# Patient Record
Sex: FEMALE | Race: OTHER | Employment: OTHER | URBAN - NONMETROPOLITAN AREA
[De-identification: names, ages, dates, MRNs, and addresses within clinical notes are randomized per-mention and may not be internally consistent; named-entity substitution may affect disease eponyms.]

---

## 2022-05-04 ENCOUNTER — TELEPHONE (OUTPATIENT)
Dept: ENT CLINIC | Age: 72
End: 2022-05-04

## 2022-05-04 NOTE — TELEPHONE ENCOUNTER
Received a call from this patient, she's coming from [facility], she's hoping to get surgery from you . As she was talking she had to take a deep breath between each couple words, assuming she has a trach. She expressed her hope to get surgery the next day or two so that she doesn't have to fly out or drive out again for surgery. I told her it may require more than one trip and that your booking up with surgeries. Please advise on if we should reschedule her to a different date to where we could have that option with surgery following day or what the best option would be.

## 2022-05-05 NOTE — TELEPHONE ENCOUNTER
Next time I am in the clinic, please get this information in front of me so I can take a look at it. Most likely you are correct, she will need to schedule to see me substantially further in the future if she wishes to have me hold a surgical spot for her tentatively based on her desire to get surgery during the same trip. Please confirm that she has a tracheostomy. That would be germane to making this choice.     Thank you    PFC

## 2022-05-09 ENCOUNTER — TELEPHONE (OUTPATIENT)
Dept: ENT CLINIC | Age: 72
End: 2022-05-09

## 2022-05-09 NOTE — TELEPHONE ENCOUNTER
I spoke to Carlos Raya and her son Elizabeth Baker today about her visit in July. She is scheduled in the office on Tuesday 7/5/22 at 1:00pm.  I am holding surgery time on Wednesday 7/6/22 @ 12:00pm (and will keep Friday 7/8/22 lighter in case you need to take her back to surgery prior to discharge). They were informed she will likely be in the hospital through the weekend and I have her scheduled in the office for a post op on Monday 7/11/22. Elizabeth Baker said he will book round trip flights for 1 week and if he needs to change the date to return home he will worry about that then. They would love a call from Dr Anabell Valverde regarding his thoughts on the plan if possible. Please call Elizabeth Baker at [phone].

## 2022-05-11 ENCOUNTER — TELEPHONE (OUTPATIENT)
Dept: ENT CLINIC | Age: 72
End: 2022-05-11

## 2022-05-11 NOTE — TELEPHONE ENCOUNTER
Patient is asking about possibly needing a breathing test before surgery. I am not sure if she is needing pulmonary clearance before her procedure? Her son was on the other line and he says they received a call from her lung dr stating she needed to schedule a breathing test for her upcoming surgery. I advised that if pulmonary clearance is needed that would probably be something that they would request.  Could you call her back to discuss?

## 2022-05-13 NOTE — TELEPHONE ENCOUNTER
I tried to reach the patient and her son but no answer and voicemail was full. I did enter her Medicare information in her chart, however, the patient will need to know that her [employer] shows not valid. Also, we do not take out of state Medicaid or Medicaid plans.

## 2022-05-19 NOTE — TELEPHONE ENCOUNTER
I tried to reach the patient and her son Sonido Estrella again today but had to leave another voicemail. I left a vague voicemail letting her know that for surgery at [address] [place] [address] in general we require a clearance from cardiology and pulmonary (if being seen by either specialty) and that if the pulmonary physician needed testing, as indicated, in order to clear her for surgery in [place] she should follow through with that. Gave her my direct phone # to call if any further questions. She is scheduled for an appointment in the office with Dr Sharon Brush on 7/5/22. He has asked me to hold surgery time for her on 7/6/22 to help with her travels but will confirm exact surgery plan when here for her appointment. Surgery is holding this time for Dr Sharon Brush.

## 2022-05-20 NOTE — TELEPHONE ENCOUNTER
The patient and her son called and stated she did have the breathing test, however, the Dr wanted her to get a sleep study also and Antionette Ny does not want to enter their hospital right now if not necessary. They felt like the Dr was taking advantage of ordering a bunch of unnecessary testing. She said she does not see pulmonary on a regular basis and it is not for COPD. I informed them we should not need a clearance for surgery if she does not have pulmonary disease and she and her son assure me she does not.

## 2022-07-05 ENCOUNTER — HOSPITAL ENCOUNTER (OUTPATIENT)
Age: 72
Discharge: HOME | DRG: 140 | End: 2022-07-05
Payer: MEDICARE

## 2022-07-05 ENCOUNTER — PREP FOR PROCEDURE (OUTPATIENT)
Dept: ENT CLINIC | Age: 72
End: 2022-07-05

## 2022-07-05 ENCOUNTER — OFFICE VISIT (OUTPATIENT)
Dept: ENT CLINIC | Age: 72
End: 2022-07-05
Payer: MEDICARE

## 2022-07-05 ENCOUNTER — PROCEDURE VISIT (OUTPATIENT)
Dept: ENT CLINIC | Age: 72
End: 2022-07-05
Payer: MEDICARE

## 2022-07-05 ENCOUNTER — HOSPITAL ENCOUNTER (OUTPATIENT)
Dept: CT IMAGING | Age: 72
Discharge: HOME | DRG: 140 | End: 2022-07-05
Payer: MEDICARE

## 2022-07-05 VITALS
OXYGEN SATURATION: 92 % | HEART RATE: 42 BPM | RESPIRATION RATE: 12 BRPM | HEIGHT: 62 IN | WEIGHT: 98.6 LBS | BODY MASS INDEX: 18.14 KG/M2 | DIASTOLIC BLOOD PRESSURE: 64 MMHG | SYSTOLIC BLOOD PRESSURE: 102 MMHG | TEMPERATURE: 96.1 F

## 2022-07-05 VITALS — SYSTOLIC BLOOD PRESSURE: 102 MMHG | DIASTOLIC BLOOD PRESSURE: 64 MMHG

## 2022-07-05 DIAGNOSIS — Q31.0 LARYNGEAL WEB: Primary | ICD-10-CM

## 2022-07-05 DIAGNOSIS — J38.7 CRICOARYTENOID JOINT FIXATION: ICD-10-CM

## 2022-07-05 DIAGNOSIS — R06.1 CHRONIC STRIDOR: ICD-10-CM

## 2022-07-05 DIAGNOSIS — R06.1 STRIDOR: Primary | ICD-10-CM

## 2022-07-05 DIAGNOSIS — Z98.890 HISTORY OF TRACHEOSTOMY: ICD-10-CM

## 2022-07-05 DIAGNOSIS — Z01.818 PRE-OP TESTING: Primary | ICD-10-CM

## 2022-07-05 DIAGNOSIS — J38.6 POSTERIOR GLOTTIC STENOSIS: ICD-10-CM

## 2022-07-05 DIAGNOSIS — R06.1 STRIDOR: ICD-10-CM

## 2022-07-05 DIAGNOSIS — Z01.818 PRE-OP TESTING: ICD-10-CM

## 2022-07-05 PROBLEM — J39.8 TRACHEA, STENOSIS: Status: ACTIVE | Noted: 2022-07-05

## 2022-07-05 LAB
ALBUMIN SERPL-MCNC: 4.6 G/DL (ref 3.5–5.1)
ALP BLD-CCNC: 70 U/L (ref 38–126)
ALT SERPL-CCNC: 32 U/L (ref 11–66)
ANION GAP SERPL CALCULATED.3IONS-SCNC: 9 MEQ/L (ref 8–16)
AST SERPL-CCNC: 44 U/L (ref 5–40)
BASOPHILS # BLD: 0.6 %
BASOPHILS ABSOLUTE: 0.1 THOU/MM3 (ref 0–0.1)
BILIRUB SERPL-MCNC: 0.3 MG/DL (ref 0.3–1.2)
BUN BLDV-MCNC: 14 MG/DL (ref 7–22)
CALCIUM SERPL-MCNC: 10 MG/DL (ref 8.5–10.5)
CHLORIDE BLD-SCNC: 97 MEQ/L (ref 98–111)
CO2: 33 MEQ/L (ref 23–33)
CREAT SERPL-MCNC: 0.5 MG/DL (ref 0.4–1.2)
EOSINOPHIL # BLD: 0.1 %
EOSINOPHILS ABSOLUTE: 0 THOU/MM3 (ref 0–0.4)
ERYTHROCYTE [DISTWIDTH] IN BLOOD BY AUTOMATED COUNT: 13.5 % (ref 11.5–14.5)
ERYTHROCYTE [DISTWIDTH] IN BLOOD BY AUTOMATED COUNT: 47.7 FL (ref 35–45)
GFR SERPL CREATININE-BSD FRML MDRD: > 90 ML/MIN/1.73M2
GLUCOSE BLD-MCNC: 94 MG/DL (ref 70–108)
HCT VFR BLD CALC: 41.6 % (ref 37–47)
HEMOGLOBIN: 13.5 GM/DL (ref 12–16)
IMMATURE GRANS (ABS): 0.02 THOU/MM3 (ref 0–0.07)
IMMATURE GRANULOCYTES: 0.2 %
LYMPHOCYTES # BLD: 10.1 %
LYMPHOCYTES ABSOLUTE: 0.9 THOU/MM3 (ref 1–4.8)
MCH RBC QN AUTO: 31.3 PG (ref 26–33)
MCHC RBC AUTO-ENTMCNC: 32.5 GM/DL (ref 32.2–35.5)
MCV RBC AUTO: 96.5 FL (ref 81–99)
MONOCYTES # BLD: 0.9 %
MONOCYTES ABSOLUTE: 0.1 THOU/MM3 (ref 0.4–1.3)
NUCLEATED RED BLOOD CELLS: 0 /100 WBC
PLATELET # BLD: 239 THOU/MM3 (ref 130–400)
PMV BLD AUTO: 9.2 FL (ref 9.4–12.4)
POTASSIUM SERPL-SCNC: 4 MEQ/L (ref 3.5–5.2)
RBC # BLD: 4.31 MILL/MM3 (ref 4.2–5.4)
SEG NEUTROPHILS: 88.1 %
SEGMENTED NEUTROPHILS ABSOLUTE COUNT: 7.5 THOU/MM3 (ref 1.8–7.7)
SODIUM BLD-SCNC: 139 MEQ/L (ref 135–145)
TOTAL PROTEIN: 7.7 G/DL (ref 6.1–8)
WBC # BLD: 8.5 THOU/MM3 (ref 4.8–10.8)

## 2022-07-05 PROCEDURE — 1036F TOBACCO NON-USER: CPT | Performed by: PHYSICIAN ASSISTANT

## 2022-07-05 PROCEDURE — 70490 CT SOFT TISSUE NECK W/O DYE: CPT

## 2022-07-05 PROCEDURE — G8427 DOCREV CUR MEDS BY ELIG CLIN: HCPCS | Performed by: PHYSICIAN ASSISTANT

## 2022-07-05 PROCEDURE — G8400 PT W/DXA NO RESULTS DOC: HCPCS | Performed by: PHYSICIAN ASSISTANT

## 2022-07-05 PROCEDURE — 96372 THER/PROPH/DIAG INJ SC/IM: CPT | Performed by: OTOLARYNGOLOGY

## 2022-07-05 PROCEDURE — 85025 COMPLETE CBC W/AUTO DIFF WBC: CPT

## 2022-07-05 PROCEDURE — G8419 CALC BMI OUT NRM PARAM NOF/U: HCPCS | Performed by: PHYSICIAN ASSISTANT

## 2022-07-05 PROCEDURE — 1090F PRES/ABSN URINE INCON ASSESS: CPT | Performed by: PHYSICIAN ASSISTANT

## 2022-07-05 PROCEDURE — 3017F COLORECTAL CA SCREEN DOC REV: CPT | Performed by: PHYSICIAN ASSISTANT

## 2022-07-05 PROCEDURE — 99203 OFFICE O/P NEW LOW 30 MIN: CPT | Performed by: PHYSICIAN ASSISTANT

## 2022-07-05 PROCEDURE — 1123F ACP DISCUSS/DSCN MKR DOCD: CPT | Performed by: PHYSICIAN ASSISTANT

## 2022-07-05 PROCEDURE — 80053 COMPREHEN METABOLIC PANEL: CPT

## 2022-07-05 RX ORDER — AMOXICILLIN AND CLAVULANATE POTASSIUM 875; 125 MG/1; MG/1
1 TABLET, FILM COATED ORAL 2 TIMES DAILY
Status: ON HOLD | COMMUNITY
Start: 2021-10-25 | End: 2022-07-14 | Stop reason: HOSPADM

## 2022-07-05 RX ORDER — SODIUM CHLORIDE 0.9 % (FLUSH) 0.9 %
5-40 SYRINGE (ML) INJECTION PRN
Status: CANCELLED | OUTPATIENT
Start: 2022-07-05

## 2022-07-05 RX ORDER — SODIUM CHLORIDE 9 MG/ML
INJECTION, SOLUTION INTRAVENOUS PRN
Status: CANCELLED | OUTPATIENT
Start: 2022-07-05

## 2022-07-05 RX ORDER — DEXAMETHASONE SODIUM PHOSPHATE 10 MG/ML
6 INJECTION INTRAMUSCULAR; INTRAVENOUS ONCE
Status: COMPLETED | OUTPATIENT
Start: 2022-07-05 | End: 2022-07-05

## 2022-07-05 RX ORDER — BUDESONIDE 0.5 MG/2ML
0.5 INHALANT ORAL
Status: ON HOLD | COMMUNITY
Start: 2022-03-16 | End: 2022-07-14

## 2022-07-05 RX ORDER — DEXAMETHASONE SODIUM PHOSPHATE 4 MG/ML
4 INJECTION, SOLUTION INTRA-ARTICULAR; INTRALESIONAL; INTRAMUSCULAR; INTRAVENOUS; SOFT TISSUE ONCE
Qty: 0.4 ML | Refills: 0
Start: 2022-07-05 | End: 2022-07-05

## 2022-07-05 RX ORDER — LISINOPRIL 10 MG/1
TABLET ORAL
Status: ON HOLD | COMMUNITY
Start: 2022-06-24 | End: 2022-07-12 | Stop reason: HOSPADM

## 2022-07-05 RX ORDER — SODIUM CHLORIDE 0.9 % (FLUSH) 0.9 %
5-40 SYRINGE (ML) INJECTION EVERY 12 HOURS SCHEDULED
Status: CANCELLED | OUTPATIENT
Start: 2022-07-05

## 2022-07-05 RX ORDER — FLUCONAZOLE 100 MG/1
TABLET ORAL
Status: ON HOLD | COMMUNITY
Start: 2022-06-24 | End: 2022-07-14 | Stop reason: HOSPADM

## 2022-07-05 RX ORDER — ESCITALOPRAM OXALATE 10 MG/1
10 TABLET ORAL
Status: ON HOLD | COMMUNITY
Start: 2021-10-25 | End: 2022-07-13

## 2022-07-05 RX ADMIN — DEXAMETHASONE SODIUM PHOSPHATE 6 MG: 10 INJECTION INTRAMUSCULAR; INTRAVENOUS at 15:42

## 2022-07-05 NOTE — PROGRESS NOTES
[facility]  Otolaryngology Head and Neck Surgery  Dr. Sandra Maloney MD  Pt Name: Letha Davis  MRN: [id number]  YOB: 1950  Date of evaluation: 7/5/2022  Primary Care Physician: No primary care provider on file. 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 See the note from Lisa Carlson for further evaluation. Type of Endoscopy: Flexible Fiberoptic Nasolaryngoscopy    Anesthesia: Topical Lidocaine after Afrin vasoconstriction    Consent: taken and witnessed        History of Chief Complaint: Stridor       No chief complaint on file. Past Medical History   has no past medical history on file. Past Surgical History   has a past surgical history that includes Throat surgery (2010). Medications  Current Medications:   Current Outpatient Medications   Medication Sig Dispense Refill    fluconazole (DIFLUCAN) 100 MG tablet TAKE 1 TABLET (100 MG) BY MOUTH DAILY FOR 3 DAYS.  metoprolol tartrate (LOPRESSOR) 25 MG tablet Take 25 mg by mouth 2 times daily      lisinopril (PRINIVIL;ZESTRIL) 10 MG tablet TAKE 1 TABLET BY MOUTH EVERY DAY      budesonide (PULMICORT) 0.5 MG/2ML nebulizer suspension Inhale 0.5 mg into the lungs      amoxicillin-clavulanate (AUGMENTIN) 875-125 MG per tablet Take 1 tablet by mouth 2 times daily      escitalopram (LEXAPRO) 10 MG tablet Take 10 mg by mouth      dexamethasone (DECADRON) 4 MG/ML injection Inject 1 mL into the muscle once for 1 dose 0.4 mL 0     No current facility-administered medications for this visit. Home Medications:   Prior to Admission medications    Medication Sig Start Date End Date Taking? Authorizing Provider   fluconazole (DIFLUCAN) 100 MG tablet TAKE 1 TABLET (100 MG) BY MOUTH DAILY FOR 3 DAYS.  6/24/22   Historical Provider, MD   metoprolol tartrate (LOPRESSOR) 25 MG tablet Take 25 mg by mouth 2 times daily 3/16/22 7/25/22  Historical Provider, MD   lisinopril (PRINIVIL;ZESTRIL) 10 MG tablet TAKE 1 TABLET BY MOUTH EVERY DAY 6/24/22   Historical Provider, MD   budesonide (PULMICORT) 0.5 MG/2ML nebulizer suspension Inhale 0.5 mg into the lungs 3/16/22 4/29/23  Historical Provider, MD   amoxicillin-clavulanate (AUGMENTIN) 875-125 MG per tablet Take 1 tablet by mouth 2 times daily 10/25/21   Historical Provider, MD   escitalopram (LEXAPRO) 10 MG tablet Take 10 mg by mouth 10/25/21 10/26/22  Historical Provider, MD   dexamethasone (DECADRON) 4 MG/ML injection Inject 1 mL into the muscle once for 1 dose 7/5/22 7/5/22  Jerome Carroll MD       Allergies  Patient has no known allergies. Family History  family history is not on file. Social History  Tobacco use:  reports that she has never smoked. She has never used smokeless tobacco.  Alcohol use:  reports previous alcohol use. Drug use:  has no history on file for drug use. Findings:     The patient's larynx was abnormal for the presence of an extremely crowded larynx with her right vocal fold being in an anatomical position and her left side being across the midline and opposing the right side in a manner consistent with either synkinesis or a posterior glottic web that is very much biased towards the ankylosis of the left side. Regardless of the cause, it is a nonserviceable airway that the patient is somehow tolerating. Laryngoscopy images:                              Addendum: CT scan images            See below for addendum interpretation                IMPRESSIONS:  1. 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 This happened so long ago that it is impossible to tell without rigid instrumentation and manipulation of her soft tissue and cartilaginous tissue elements. Addendum:  2.   After reviewing the patient's high-resolution CT scan of the neck I see strong confirmation of significant aberration of the cartilaginous and calcium mineralized components of the left cricoarytenoid joint with a canting of the joint and body of the arytenoid in 3 planes towards the right side. The right cricoarytenoid joint has a distinct appearance of healthiness and therefore likely of mobility. See below in the plan for a follow-up on addendum. PLAN:  1. To the operating room for an effort at airway widening that would include one or several of the following possibilities:  Left true vocal fold lateralization  Left true vocal fold arytenoid partial resection  Post cricoid flap reconstruction of the posterior Commissure mobilization of the right side if it is found to be ankylosed   Among others    I will use jet ventilation to start the case and we will probably convert to percutaneous jet ventilation early on so as to not encumber my access to the posterior commissure with a check catheter. She will not be possible to jet ventilate for very long using laryngoscopic air sources given the narrowness of her glottic aperture. I reviewed with the patient and her family the indications benefits limitations and risks of proceeding in this manner to their satisfaction. They reported being pleased with the outcome of the visit and being willing to proceed as recommended. I will get a CT scan of her neck to look into the soft tissues of the larynx with the hopes that I can visualize some degree of ankylosis on the left side and hopefully normal joint on the right side. 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   Plan is much more firmly anchored to the patient's cricoarytenoid anatomy now and I will proceed with what I believed to be a significantly higher chance of success with a transoral laryngoplasty and reconstruction.            Sandra Maloney MD   Electronically signed 7/5/2022 at 5:45 PM
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
## 2022-07-06 ENCOUNTER — ANESTHESIA (OUTPATIENT)
Dept: OPERATING ROOM | Age: 72
DRG: 140 | End: 2022-07-06
Payer: MEDICARE

## 2022-07-06 ENCOUNTER — APPOINTMENT (OUTPATIENT)
Dept: GENERAL RADIOLOGY | Age: 72
DRG: 140 | End: 2022-07-06
Attending: OTOLARYNGOLOGY
Payer: MEDICARE

## 2022-07-06 ENCOUNTER — HOSPITAL ENCOUNTER (INPATIENT)
Age: 72
LOS: 6 days | Discharge: HOME | DRG: 140 | End: 2022-07-12
Attending: OTOLARYNGOLOGY | Admitting: OTOLARYNGOLOGY
Payer: MEDICARE

## 2022-07-06 ENCOUNTER — ANESTHESIA EVENT (OUTPATIENT)
Dept: OPERATING ROOM | Age: 72
DRG: 140 | End: 2022-07-06
Payer: MEDICARE

## 2022-07-06 DIAGNOSIS — R06.1 STRIDOR: ICD-10-CM

## 2022-07-06 PROBLEM — Z98.890 STATUS POST SURGERY: Status: ACTIVE | Noted: 2022-07-06

## 2022-07-06 LAB
ANION GAP SERPL CALCULATED.3IONS-SCNC: 7 MEQ/L (ref 8–16)
BASE EXCESS (CALCULATED): 0.7 MMOL/L (ref -2.5–2.5)
BUN BLDV-MCNC: 14 MG/DL (ref 7–22)
CALCIUM IONIZED SERUM: 1.2 MMOL/L (ref 1.12–1.32)
CALCIUM SERPL-MCNC: 8.3 MG/DL (ref 8.5–10.5)
CHLORIDE BLD-SCNC: 106 MEQ/L (ref 98–111)
CO2: 28 MEQ/L (ref 23–33)
COLLECTED BY:: ABNORMAL
CREAT SERPL-MCNC: 0.5 MG/DL (ref 0.4–1.2)
EKG ATRIAL RATE: 64 BPM
EKG ATRIAL RATE: 66 BPM
EKG P AXIS: 57 DEGREES
EKG P AXIS: 63 DEGREES
EKG P-R INTERVAL: 118 MS
EKG P-R INTERVAL: 126 MS
EKG Q-T INTERVAL: 438 MS
EKG Q-T INTERVAL: 480 MS
EKG QRS DURATION: 104 MS
EKG QRS DURATION: 96 MS
EKG QTC CALCULATION (BAZETT): 459 MS
EKG QTC CALCULATION (BAZETT): 495 MS
EKG R AXIS: 12 DEGREES
EKG R AXIS: 18 DEGREES
EKG T AXIS: 111 DEGREES
EKG T AXIS: 130 DEGREES
EKG VENTRICULAR RATE: 64 BPM
EKG VENTRICULAR RATE: 66 BPM
GFR SERPL CREATININE-BSD FRML MDRD: > 90 ML/MIN/1.73M2
GLUCOSE BLD-MCNC: 119 MG/DL (ref 70–108)
GLUCOSE BLD-MCNC: 87 MG/DL (ref 70–108)
GLUCOSE, WHOLE BLOOD: 102 MG/DL (ref 70–108)
HCO3: 27 MMOL/L (ref 23–28)
HCT VFR BLD CALC: 33.4 % (ref 37–47)
HEMOGLOBIN: 10.6 GM/DL (ref 12–16)
MAGNESIUM: 1.9 MG/DL (ref 1.6–2.4)
O2 SATURATION: 100 %
PCO2: 49 MMHG (ref 35–45)
PH BLOOD GAS: 7.35 (ref 7.35–7.45)
PO2: 436 MMHG (ref 71–104)
POTASSIUM SERPL-SCNC: 4.2 MEQ/L (ref 3.5–5.2)
POTASSIUM, WHOLE BLOOD: 3.3 MEQ/L (ref 3.5–4.9)
SODIUM BLD-SCNC: 141 MEQ/L (ref 135–145)
SODIUM, WHOLE BLOOD: 143 MEQ/L (ref 138–146)

## 2022-07-06 PROCEDURE — 93010 ELECTROCARDIOGRAM REPORT: CPT | Performed by: INTERNAL MEDICINE

## 2022-07-06 PROCEDURE — 6360000002 HC RX W HCPCS: Performed by: REGISTERED NURSE

## 2022-07-06 PROCEDURE — 7100000001 HC PACU RECOVERY - ADDTL 15 MIN: Performed by: OTOLARYNGOLOGY

## 2022-07-06 PROCEDURE — 85018 HEMOGLOBIN: CPT

## 2022-07-06 PROCEDURE — 2709999900 HC NON-CHARGEABLE SUPPLY: Performed by: OTOLARYNGOLOGY

## 2022-07-06 PROCEDURE — 88305 TISSUE EXAM BY PATHOLOGIST: CPT

## 2022-07-06 PROCEDURE — 6360000002 HC RX W HCPCS: Performed by: OTOLARYNGOLOGY

## 2022-07-06 PROCEDURE — 85014 HEMATOCRIT: CPT

## 2022-07-06 PROCEDURE — 93005 ELECTROCARDIOGRAM TRACING: CPT | Performed by: OTOLARYNGOLOGY

## 2022-07-06 PROCEDURE — 82803 BLOOD GASES ANY COMBINATION: CPT

## 2022-07-06 PROCEDURE — 2580000003 HC RX 258: Performed by: OTOLARYNGOLOGY

## 2022-07-06 PROCEDURE — 3600000004 HC SURGERY LEVEL 4 BASE: Performed by: OTOLARYNGOLOGY

## 2022-07-06 PROCEDURE — 82330 ASSAY OF CALCIUM: CPT

## 2022-07-06 PROCEDURE — 82948 REAGENT STRIP/BLOOD GLUCOSE: CPT

## 2022-07-06 PROCEDURE — 3700000001 HC ADD 15 MINUTES (ANESTHESIA): Performed by: OTOLARYNGOLOGY

## 2022-07-06 PROCEDURE — 84295 ASSAY OF SERUM SODIUM: CPT

## 2022-07-06 PROCEDURE — 2580000003 HC RX 258: Performed by: PHYSICIAN ASSISTANT

## 2022-07-06 PROCEDURE — 3700000000 HC ANESTHESIA ATTENDED CARE: Performed by: OTOLARYNGOLOGY

## 2022-07-06 PROCEDURE — 2100000000 HC CCU R&B

## 2022-07-06 PROCEDURE — 71045 X-RAY EXAM CHEST 1 VIEW: CPT

## 2022-07-06 PROCEDURE — 31552 LARYNGOPLASTY LARYNGEAL STEN: CPT | Performed by: OTOLARYNGOLOGY

## 2022-07-06 PROCEDURE — 0CBS0ZZ EXCISION OF LARYNX, OPEN APPROACH: ICD-10-PCS | Performed by: OTOLARYNGOLOGY

## 2022-07-06 PROCEDURE — 5A1935Z RESPIRATORY VENTILATION, LESS THAN 24 CONSECUTIVE HOURS: ICD-10-PCS | Performed by: OTOLARYNGOLOGY

## 2022-07-06 PROCEDURE — 3600000014 HC SURGERY LEVEL 4 ADDTL 15MIN: Performed by: OTOLARYNGOLOGY

## 2022-07-06 PROCEDURE — APPNB180 APP NON BILLABLE TIME > 60 MINS: Performed by: NURSE PRACTITIONER

## 2022-07-06 PROCEDURE — 80048 BASIC METABOLIC PNL TOTAL CA: CPT

## 2022-07-06 PROCEDURE — 6360000002 HC RX W HCPCS: Performed by: PHYSICIAN ASSISTANT

## 2022-07-06 PROCEDURE — 84132 ASSAY OF SERUM POTASSIUM: CPT

## 2022-07-06 PROCEDURE — 82947 ASSAY GLUCOSE BLOOD QUANT: CPT

## 2022-07-06 PROCEDURE — 94002 VENT MGMT INPAT INIT DAY: CPT

## 2022-07-06 PROCEDURE — 2500000003 HC RX 250 WO HCPCS: Performed by: REGISTERED NURSE

## 2022-07-06 PROCEDURE — 7100000000 HC PACU RECOVERY - FIRST 15 MIN: Performed by: OTOLARYNGOLOGY

## 2022-07-06 PROCEDURE — 31561 LARYNSCOP REMVE CART + SCOP: CPT | Performed by: OTOLARYNGOLOGY

## 2022-07-06 PROCEDURE — 36415 COLL VENOUS BLD VENIPUNCTURE: CPT

## 2022-07-06 PROCEDURE — 2700000000 HC OXYGEN THERAPY PER DAY

## 2022-07-06 PROCEDURE — 94761 N-INVAS EAR/PLS OXIMETRY MLT: CPT

## 2022-07-06 PROCEDURE — 83735 ASSAY OF MAGNESIUM: CPT

## 2022-07-06 RX ORDER — GLYCOPYRROLATE 1 MG/5 ML
SYRINGE (ML) INTRAVENOUS PRN
Status: DISCONTINUED | OUTPATIENT
Start: 2022-07-06 | End: 2022-07-06 | Stop reason: SDUPTHER

## 2022-07-06 RX ORDER — ACETAMINOPHEN 650 MG/1
650 SUPPOSITORY RECTAL EVERY 6 HOURS PRN
Status: DISCONTINUED | OUTPATIENT
Start: 2022-07-06 | End: 2022-07-12 | Stop reason: HOSPADM

## 2022-07-06 RX ORDER — DEXAMETHASONE SODIUM PHOSPHATE 10 MG/ML
INJECTION, EMULSION INTRAMUSCULAR; INTRAVENOUS PRN
Status: DISCONTINUED | OUTPATIENT
Start: 2022-07-06 | End: 2022-07-06 | Stop reason: SDUPTHER

## 2022-07-06 RX ORDER — SODIUM CHLORIDE 9 MG/ML
INJECTION, SOLUTION INTRAVENOUS CONTINUOUS
Status: DISCONTINUED | OUTPATIENT
Start: 2022-07-06 | End: 2022-07-09

## 2022-07-06 RX ORDER — LIDOCAINE HYDROCHLORIDE 20 MG/ML
INJECTION, SOLUTION INTRAVENOUS PRN
Status: DISCONTINUED | OUTPATIENT
Start: 2022-07-06 | End: 2022-07-06 | Stop reason: SDUPTHER

## 2022-07-06 RX ORDER — PIPERACILLIN SODIUM, TAZOBACTAM SODIUM 3; .375 G/15ML; G/15ML
INJECTION, POWDER, LYOPHILIZED, FOR SOLUTION INTRAVENOUS PRN
Status: DISCONTINUED | OUTPATIENT
Start: 2022-07-06 | End: 2022-07-06 | Stop reason: SDUPTHER

## 2022-07-06 RX ORDER — FENTANYL CITRATE 50 UG/ML
INJECTION, SOLUTION INTRAMUSCULAR; INTRAVENOUS PRN
Status: DISCONTINUED | OUTPATIENT
Start: 2022-07-06 | End: 2022-07-06 | Stop reason: SDUPTHER

## 2022-07-06 RX ORDER — HYDROMORPHONE HCL 110MG/55ML
PATIENT CONTROLLED ANALGESIA SYRINGE INTRAVENOUS PRN
Status: DISCONTINUED | OUTPATIENT
Start: 2022-07-06 | End: 2022-07-06 | Stop reason: SDUPTHER

## 2022-07-06 RX ORDER — EPINEPHRINE 1 MG/ML
INJECTION, SOLUTION, CONCENTRATE INTRAVENOUS PRN
Status: DISCONTINUED | OUTPATIENT
Start: 2022-07-06 | End: 2022-07-06 | Stop reason: ALTCHOICE

## 2022-07-06 RX ORDER — POLYETHYLENE GLYCOL 3350 17 G/17G
17 POWDER, FOR SOLUTION ORAL DAILY PRN
Status: DISCONTINUED | OUTPATIENT
Start: 2022-07-06 | End: 2022-07-12 | Stop reason: HOSPADM

## 2022-07-06 RX ORDER — ONDANSETRON 4 MG/1
4 TABLET, ORALLY DISINTEGRATING ORAL EVERY 8 HOURS PRN
Status: DISCONTINUED | OUTPATIENT
Start: 2022-07-06 | End: 2022-07-12 | Stop reason: HOSPADM

## 2022-07-06 RX ORDER — BUDESONIDE 0.5 MG/2ML
0.5 INHALANT ORAL 2 TIMES DAILY
Status: CANCELLED | OUTPATIENT
Start: 2022-07-06

## 2022-07-06 RX ORDER — SODIUM CHLORIDE 9 MG/ML
INJECTION, SOLUTION INTRAVENOUS PRN
Status: DISCONTINUED | OUTPATIENT
Start: 2022-07-06 | End: 2022-07-12 | Stop reason: HOSPADM

## 2022-07-06 RX ORDER — SODIUM CHLORIDE 0.9 % (FLUSH) 0.9 %
5-40 SYRINGE (ML) INJECTION EVERY 12 HOURS SCHEDULED
Status: DISCONTINUED | OUTPATIENT
Start: 2022-07-06 | End: 2022-07-06 | Stop reason: HOSPADM

## 2022-07-06 RX ORDER — METOCLOPRAMIDE HYDROCHLORIDE 5 MG/ML
10 INJECTION INTRAMUSCULAR; INTRAVENOUS
Status: DISCONTINUED | OUTPATIENT
Start: 2022-07-06 | End: 2022-07-06 | Stop reason: HOSPADM

## 2022-07-06 RX ORDER — ONDANSETRON 2 MG/ML
4 INJECTION INTRAMUSCULAR; INTRAVENOUS EVERY 6 HOURS PRN
Status: DISCONTINUED | OUTPATIENT
Start: 2022-07-06 | End: 2022-07-12 | Stop reason: HOSPADM

## 2022-07-06 RX ORDER — FENTANYL CITRATE 50 UG/ML
25 INJECTION, SOLUTION INTRAMUSCULAR; INTRAVENOUS EVERY 5 MIN PRN
Status: DISCONTINUED | OUTPATIENT
Start: 2022-07-06 | End: 2022-07-06 | Stop reason: HOSPADM

## 2022-07-06 RX ORDER — SODIUM CHLORIDE 9 MG/ML
INJECTION, SOLUTION INTRAVENOUS PRN
Status: DISCONTINUED | OUTPATIENT
Start: 2022-07-06 | End: 2022-07-06 | Stop reason: HOSPADM

## 2022-07-06 RX ORDER — SODIUM CHLORIDE 0.9 % (FLUSH) 0.9 %
5-40 SYRINGE (ML) INJECTION PRN
Status: DISCONTINUED | OUTPATIENT
Start: 2022-07-06 | End: 2022-07-06 | Stop reason: HOSPADM

## 2022-07-06 RX ORDER — ACETAMINOPHEN 325 MG/1
650 TABLET ORAL EVERY 6 HOURS PRN
Status: DISCONTINUED | OUTPATIENT
Start: 2022-07-06 | End: 2022-07-12 | Stop reason: HOSPADM

## 2022-07-06 RX ORDER — MIDAZOLAM HYDROCHLORIDE 1 MG/ML
INJECTION INTRAMUSCULAR; INTRAVENOUS PRN
Status: DISCONTINUED | OUTPATIENT
Start: 2022-07-06 | End: 2022-07-06 | Stop reason: SDUPTHER

## 2022-07-06 RX ORDER — FENTANYL CITRATE 50 UG/ML
50 INJECTION, SOLUTION INTRAMUSCULAR; INTRAVENOUS EVERY 5 MIN PRN
Status: DISCONTINUED | OUTPATIENT
Start: 2022-07-06 | End: 2022-07-06 | Stop reason: HOSPADM

## 2022-07-06 RX ORDER — NALOXONE HYDROCHLORIDE 0.4 MG/ML
INJECTION, SOLUTION INTRAMUSCULAR; INTRAVENOUS; SUBCUTANEOUS PRN
Status: DISCONTINUED | OUTPATIENT
Start: 2022-07-06 | End: 2022-07-06 | Stop reason: SDUPTHER

## 2022-07-06 RX ORDER — ONDANSETRON 2 MG/ML
INJECTION INTRAMUSCULAR; INTRAVENOUS PRN
Status: DISCONTINUED | OUTPATIENT
Start: 2022-07-06 | End: 2022-07-06 | Stop reason: SDUPTHER

## 2022-07-06 RX ORDER — FLUMAZENIL 0.1 MG/ML
INJECTION, SOLUTION INTRAVENOUS PRN
Status: DISCONTINUED | OUTPATIENT
Start: 2022-07-06 | End: 2022-07-06 | Stop reason: SDUPTHER

## 2022-07-06 RX ORDER — SODIUM CHLORIDE 0.9 % (FLUSH) 0.9 %
5-40 SYRINGE (ML) INJECTION EVERY 12 HOURS SCHEDULED
Status: DISCONTINUED | OUTPATIENT
Start: 2022-07-06 | End: 2022-07-12 | Stop reason: HOSPADM

## 2022-07-06 RX ORDER — DIPHENHYDRAMINE HYDROCHLORIDE 50 MG/ML
12.5 INJECTION INTRAMUSCULAR; INTRAVENOUS
Status: DISCONTINUED | OUTPATIENT
Start: 2022-07-06 | End: 2022-07-06 | Stop reason: HOSPADM

## 2022-07-06 RX ORDER — ROCURONIUM BROMIDE 10 MG/ML
INJECTION, SOLUTION INTRAVENOUS PRN
Status: DISCONTINUED | OUTPATIENT
Start: 2022-07-06 | End: 2022-07-06 | Stop reason: SDUPTHER

## 2022-07-06 RX ORDER — ENOXAPARIN SODIUM 100 MG/ML
30 INJECTION SUBCUTANEOUS EVERY 24 HOURS
Status: DISCONTINUED | OUTPATIENT
Start: 2022-07-07 | End: 2022-07-12 | Stop reason: HOSPADM

## 2022-07-06 RX ORDER — SODIUM CHLORIDE 0.9 % (FLUSH) 0.9 %
5-40 SYRINGE (ML) INJECTION PRN
Status: DISCONTINUED | OUTPATIENT
Start: 2022-07-06 | End: 2022-07-12 | Stop reason: HOSPADM

## 2022-07-06 RX ORDER — PROPOFOL 10 MG/ML
INJECTION, EMULSION INTRAVENOUS PRN
Status: DISCONTINUED | OUTPATIENT
Start: 2022-07-06 | End: 2022-07-06 | Stop reason: SDUPTHER

## 2022-07-06 RX ORDER — KETOROLAC TROMETHAMINE 30 MG/ML
15 INJECTION, SOLUTION INTRAMUSCULAR; INTRAVENOUS EVERY 6 HOURS PRN
Status: DISPENSED | OUTPATIENT
Start: 2022-07-06 | End: 2022-07-11

## 2022-07-06 RX ADMIN — HYDROMORPHONE HYDROCHLORIDE 0.5 MG: 2 INJECTION INTRAMUSCULAR; INTRAVENOUS; SUBCUTANEOUS at 13:31

## 2022-07-06 RX ADMIN — FLUMAZENIL 0.2 MG: 0.1 INJECTION INTRAVENOUS at 15:43

## 2022-07-06 RX ADMIN — LIDOCAINE HYDROCHLORIDE 100 MG: 20 INJECTION, SOLUTION INTRAVENOUS at 13:07

## 2022-07-06 RX ADMIN — FENTANYL CITRATE 25 MCG: 50 INJECTION, SOLUTION INTRAMUSCULAR; INTRAVENOUS at 13:55

## 2022-07-06 RX ADMIN — SUGAMMADEX 200 MG: 100 INJECTION, SOLUTION INTRAVENOUS at 15:10

## 2022-07-06 RX ADMIN — SUGAMMADEX 200 MG: 100 INJECTION, SOLUTION INTRAVENOUS at 15:50

## 2022-07-06 RX ADMIN — Medication 0.2 MG: at 13:14

## 2022-07-06 RX ADMIN — ROCURONIUM BROMIDE 50 MG: 50 INJECTION, SOLUTION INTRAVENOUS at 13:29

## 2022-07-06 RX ADMIN — PHENYLEPHRINE HYDROCHLORIDE 100 MCG: 10 INJECTION INTRAVENOUS at 13:40

## 2022-07-06 RX ADMIN — PHENYLEPHRINE HYDROCHLORIDE 200 MCG: 10 INJECTION INTRAVENOUS at 14:07

## 2022-07-06 RX ADMIN — ROCURONIUM BROMIDE 30 MG: 50 INJECTION, SOLUTION INTRAVENOUS at 13:51

## 2022-07-06 RX ADMIN — MIDAZOLAM 1 MG: 1 INJECTION INTRAMUSCULAR; INTRAVENOUS at 13:52

## 2022-07-06 RX ADMIN — PROPOFOL 50 MG: 10 INJECTION, EMULSION INTRAVENOUS at 14:12

## 2022-07-06 RX ADMIN — PROPOFOL 50 MCG/KG/MIN: 10 INJECTION, EMULSION INTRAVENOUS at 13:18

## 2022-07-06 RX ADMIN — SODIUM CHLORIDE: 9 INJECTION, SOLUTION INTRAVENOUS at 13:01

## 2022-07-06 RX ADMIN — Medication 0.2 MG: at 13:27

## 2022-07-06 RX ADMIN — FLUMAZENIL 0.1 MG: 0.1 INJECTION INTRAVENOUS at 15:49

## 2022-07-06 RX ADMIN — MIDAZOLAM 1 MG: 1 INJECTION INTRAMUSCULAR; INTRAVENOUS at 13:34

## 2022-07-06 RX ADMIN — PHENYLEPHRINE HYDROCHLORIDE 200 MCG: 10 INJECTION INTRAVENOUS at 13:38

## 2022-07-06 RX ADMIN — FLUMAZENIL 0.2 MG: 0.1 INJECTION INTRAVENOUS at 15:38

## 2022-07-06 RX ADMIN — FENTANYL CITRATE 50 MCG: 50 INJECTION, SOLUTION INTRAMUSCULAR; INTRAVENOUS at 13:07

## 2022-07-06 RX ADMIN — PIPERACILLIN AND TAZOBACTAM 3.38 G: 3; .375 INJECTION, POWDER, LYOPHILIZED, FOR SOLUTION INTRAVENOUS at 13:30

## 2022-07-06 RX ADMIN — PIPERACILLIN AND TAZOBACTAM 3375 MG: 3; .375 INJECTION, POWDER, LYOPHILIZED, FOR SOLUTION INTRAVENOUS at 23:19

## 2022-07-06 RX ADMIN — PROPOFOL 50 MG: 10 INJECTION, EMULSION INTRAVENOUS at 14:14

## 2022-07-06 RX ADMIN — ONDANSETRON 4 MG: 2 INJECTION INTRAMUSCULAR; INTRAVENOUS at 14:50

## 2022-07-06 RX ADMIN — PHENYLEPHRINE HYDROCHLORIDE 200 MCG: 10 INJECTION INTRAVENOUS at 13:23

## 2022-07-06 RX ADMIN — DEXAMETHASONE SODIUM PHOSPHATE 10 MG: 10 INJECTION, EMULSION INTRAMUSCULAR; INTRAVENOUS at 13:07

## 2022-07-06 RX ADMIN — NALOXONE HYDROCHLORIDE 0.4 MG: 0.4 INJECTION, SOLUTION INTRAMUSCULAR; INTRAVENOUS; SUBCUTANEOUS at 15:15

## 2022-07-06 RX ADMIN — PROPOFOL 120 MG: 10 INJECTION, EMULSION INTRAVENOUS at 13:08

## 2022-07-06 RX ADMIN — ROCURONIUM BROMIDE 20 MG: 50 INJECTION, SOLUTION INTRAVENOUS at 14:48

## 2022-07-06 RX ADMIN — KETOROLAC TROMETHAMINE 15 MG: 30 INJECTION, SOLUTION INTRAMUSCULAR; INTRAVENOUS at 20:23

## 2022-07-06 ASSESSMENT — PAIN - FUNCTIONAL ASSESSMENT: PAIN_FUNCTIONAL_ASSESSMENT: NONE - DENIES PAIN

## 2022-07-06 ASSESSMENT — PAIN SCALES - GENERAL: PAINLEVEL_OUTOF10: 0

## 2022-07-06 ASSESSMENT — PAIN SCALES - WONG BAKER: WONGBAKER_NUMERICALRESPONSE: 0

## 2022-07-06 NOTE — OP NOTE
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       Procedure(s):  Transoral Laryngoplasty Left Partial Arytenoidectomy, Left vocal cord lateralization and post cricoid flap  Procedure correction: No post cricoid flap performed      Surgeon(s):  Velia Manzanares MD    Assistant:   * No surgical staff found *    Anesthesia: General    Estimated Blood Loss (mL): less than 50     Complications: Other: Slow emergence from general anesthesia; still intubated and deeply sedated at time of op note dictation    Specimens:   ID Type Source Tests Collected by Time Destination   A : Left arytenoid cartilage Tissue Larynx SURGICAL PATHOLOGY Velia Manzanares MD 7/6/2022 1400        Implants:  * No implants in log *      Drains: * No LDAs found *    Findings: 1. Easy jet ventilation from start of case using transit laryngoscope technique  2. 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 Percutaneous jet ventilation instituted for the remainder of the case  4. Transoral arytenoid ectomy performed with excellent mobilization and lateralization of residual arytenoid as part of laryngoplasty  5. Very slow emergence from general anesthesia with near unresponsiveness for over 1 hour post discontinuation of sedating medications    Detailed Description of Procedure: The patient was taken to the operating room awake and placed in the supine position. General anesthesia was induced and the patient was intubated with an LMA device without difficulty and without vital sign instability. She was allowed to breathe spontaneously until just before the beginning of the case.   I fashioned a custom made heat activated maxillary dental guard to protect her dentition in preparation for suspension laryngoscopy. I performed a timeout verifying the patient's identity and planned procedure. Beginning of the case, neuromuscular blockade was instituted and I remove the LMA. I passed a Yamileth laryngoscope through the left lingual cul-de-sac and picked up the epiglottis starting jet ventilation through the laryngoscope with apparent efficiency as measured by sustained near 100% oxygen saturation rates. I then adjusted the position of the Yamileth to enable good line of sight access to the larynx. This was the position of the patient's laryngeal operation throughout the case. I placed cord spreaders between the vocal folds to examine the mobility of the arytenoids and found the left side to be virtually immobile. The right side was clearly mobile. I used a 30 degree optical telescope to examine the glottis and subglottis. I saw no clear signs of a posterior glottic web. The subglottis and trachea were widely patent. There were cartilaginous irregularities anteriorly consistent with her tracheostomy history. Using the telescope to visualize the trachea, I cleansed the anterior neck and then passed the jet ventilation catheter through the neck skin and a very shallow location consistent with her prior tracheostomy and scarring between the skin and trachea. I passed the catheter distally and remove the needle beginning jet ventilation from that point on. The patient remained at 100% saturation or nearly so throughout the case even during intervals where her FiO2 was below 0.5. Bringing onto the field and operating microscope with a CO2 UltraPulse laser set at 1 mm [place] with 0 delay, I began the patient's operation. Using a suction grasper in my left hand, I made a sigmoid incision through the supraglottis from anterior left to posterior medial right on the left supraglottis and arytenoid body.   I came posteriorly across the top of the arytenoid at approximately the mid sagittal plane of that cartilaginous structure in preparation for lateralizing this structure once I removed the lateral portion of the arytenoid cartilage. I gradually took this dissection deeply into the arytenoid body and then changed from the 1 mm [place] to a 2.5 mm straight line for an incision of the arytenoid down to the level of the cricoid cartilage. I gradually extended this incision through the arytenoid deeply enough to require dissection and traction on the residual cartilage posteriorly and distally on the side proceeded further towards the cricoid cartilage. With this incision deeply made, I dissected around the lateral aspect of the arytenoid in order to be able to isolate the central and lateral arytenoid for its removal.  I then used a variety of cup forceps to remove the central and lateral portion of the arytenoid handing off this tissue to be placed in formalin for permanent section. This required some degree of suction cautery to maintain hemostasis. Once I was deeply into this process and had made a generous opening of the space lateral to the medial arytenoid face, I turned my attention to examining the deep attachments. I found the arytenoid to be fixated onto the cricoid cartilage posteriorly and medially as well as along the deep space. I used curved alligator forceps to bluntly separate the structures from each other and the cup forceps to remove more of the attached cartilage in order to separate these tissues more broadly. I then compressed the residual medial arytenoid face laterally and could now widen the patient's airway significantly in the process. I then cleaned out the base of the arytenoid and found that it was broadly fused to the cricoid cartilage without being able to discover any residual cricoarytenoid joint facet structures. Suction cautery was necessary for hemostasis. I then brought onto the field and a 4-0 Prolene suture on PS2 cutting needle.   I passed a double throw lateralization suture through the soft tissues lateral to the arytenoid down to the thyroid lamina from proximal to distal and medial to lateral.  I drew the needle deep in the wound and passed it through the arytenoid body and into the airway lumen twice in the process. I ended in the wound anteriorly and superiorly and then anabell the entire suture out through the laryngoscope in preparation for clipping the traction suture with the arytenoid face lateralized. I irrigated out the wound with copious amounts of sterile saline and checked for hemostasis. Hemostasis was confirmed. I then placed an angled endoscopic clip applier with a medium wide tray Ethicon clip into the wound dry and the suture up in the process. I clipped it again and then later lysed the excess suture material.  I closed the wound superficially with two 4-0 Monocryl sutures on a P3 cutting needle in a figure-of-eight configuration. This closed the wound well. I then carefully examined the airway and found to be markedly enlarged compared to preop with clear movement of the right cord being visualized. I checked the distal airway and it was free of blood and obstructive secretions. I then intubated the patient with a 5.0 MLT tube with endoscopic guidance without difficulty. I remove the transoral hardware and turned the patient back to anesthesia for reversal extubation in the operating room. The patient was reversed from neuromuscular blockade but continued to remain deeply asleep and virtually unresponsive for a protracted period of time. She was given a substantial dose of Narcan and considered to have enough time to have metabolized the propofol that she had been given throughout the case. But given how long she was taken to emerge, the patient was taken to the PACU mechanically ventilated. No additional sedating medications whatsoever were given to the patient to the point of this dictation.   The patient went from being virtually unresponsive to responding to verbal commands with opening her eyes and was moving all 4 limbs. She will continue to undergo neurologic checks and will hopefully be extubated prior to being transferred to the ICU for close critical care. Jyoti Charles.  Mary Alvarez MD  Cell: [phone]    Electronically signed by Jerome Carroll MD on 7/6/2022 at 4:29 PM

## 2022-07-06 NOTE — PROGRESS NOTES
1900- new consult sent to Annie Luna NP.  2849- ST elevation noticed on telemetry monitor. Lima Richardson with ENT updated. Stat EKG order placed and EKG tech called. Patient assessed. VSS. Patient denies chest pain and shortness of breath. 1906- Rapid called, Annie Luna in room along with supportive staff. 3630 Maria Luz Funes called and updated. Patient resting in bed. No complaints at this time. Will continue to monitor.

## 2022-07-06 NOTE — H&P
The below attached note is still a current and accurate rendition of the patient's presentation, physical exam, and planned surgery. I reviewed all this with the patient and her family in the holding area to their satisfaction. We will proceed as recommended. Angel Gomez. MD Kristel Montez MD   Physician   Specialty:  Otolaryngology   Progress Notes       Addendum   Encounter Date:  7/5/2022                 Expand All Collapse All        Show:Clear all  [x]Manual[x]Template[x]Copied    Added by:  Mil Oneil MD      []ver for details      [address] [address]  Otolaryngology Head and Neck Surgery  Dr. Kristel Garcia MD  Pt Name: Marcelina Lam  MRN: [id number]  YOB: 1950  Date of evaluation: 7/5/2022  Primary Care Physician: No primary care provider on file.     Reason for Endoscopy: The patient has been referred by colleague laryngologist in [facility] to evaluate and treat her airway stenosis resulting in chronic stridor. See the note from Wil Meade for further evaluation.     Type of Endoscopy: Flexible Fiberoptic Nasolaryngoscopy     Anesthesia: Topical Lidocaine after Afrin vasoconstriction     Consent: taken and witnessed           History of Chief Complaint: Stridor         No chief complaint on file.                            Past Medical History   has no past medical history on file.     Past Surgical History   has a past surgical history that includes Throat surgery (2010).    Medications  Current Medications:   Current Facility-Administered Medications          Current Outpatient Medications   Medication Sig Dispense Refill    fluconazole (DIFLUCAN) 100 MG tablet TAKE 1 TABLET (100 MG) BY MOUTH DAILY FOR 3 DAYS.         metoprolol tartrate (LOPRESSOR) 25 MG tablet Take 25 mg by mouth 2 times daily        lisinopril (PRINIVIL;ZESTRIL) 10 MG tablet TAKE 1 TABLET BY MOUTH EVERY DAY        budesonide (PULMICORT) 0.5 MG/2ML nebulizer suspension Inhale 0.5 mg into the lungs        amoxicillin-clavulanate (AUGMENTIN) 875-125 MG per tablet Take 1 tablet by mouth 2 times daily        escitalopram (LEXAPRO) 10 MG tablet Take 10 mg by mouth        dexamethasone (DECADRON) 4 MG/ML injection Inject 1 mL into the muscle once for 1 dose 0.4 mL 0      No current facility-administered medications for this visit.         Home Medications:   Home Medications           Prior to Admission medications    Medication Sig Start Date End Date Taking? Authorizing Provider   fluconazole (DIFLUCAN) 100 MG tablet TAKE 1 TABLET (100 MG) BY MOUTH DAILY FOR 3 DAYS. 6/24/22     Historical Provider, MD   metoprolol tartrate (LOPRESSOR) 25 MG tablet Take 25 mg by mouth 2 times daily 3/16/22 7/25/22   Historical Provider, MD   lisinopril (PRINIVIL;ZESTRIL) 10 MG tablet TAKE 1 TABLET BY MOUTH EVERY DAY 6/24/22     Historical Provider, MD   budesonide (PULMICORT) 0.5 MG/2ML nebulizer suspension Inhale 0.5 mg into the lungs 3/16/22 4/29/23   Historical Provider, MD   amoxicillin-clavulanate (AUGMENTIN) 875-125 MG per tablet Take 1 tablet by mouth 2 times daily 10/25/21     Historical Provider, MD   escitalopram (LEXAPRO) 10 MG tablet Take 10 mg by mouth 10/25/21 10/26/22   Historical Provider, MD   dexamethasone (DECADRON) 4 MG/ML injection Inject 1 mL into the muscle once for 1 dose 7/5/22 7/5/22   Kristel Garcia MD            Allergies  Patient has no known allergies.     Family History  family history is not on file.     Social History  Tobacco use:  reports that she has never smoked. She has never used smokeless tobacco.  Alcohol use:  reports previous alcohol use.   Drug use:  has no history on file for drug use.     Findings:      The patient's larynx was abnormal for the presence of an extremely crowded larynx with her right vocal fold being in an anatomical position and her left side being across the midline and opposing the right side in a manner consistent with either synkinesis or a posterior glottic web that is very much biased towards the ankylosis of the left side. Regardless of the cause, it is a nonserviceable airway that the patient is somehow tolerating.     Laryngoscopy images:                                      Addendum: CT scan images               See below for addendum interpretation                       IMPRESSIONS:  1. 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 This happened so long ago that it is impossible to tell without rigid instrumentation and manipulation of her soft tissue and cartilaginous tissue elements. Addendum:  2. After reviewing the patient's high-resolution CT scan of the neck I see strong confirmation of significant aberration of the cartilaginous and calcium mineralized components of the left cricoarytenoid joint with a canting of the joint and body of the arytenoid in 3 planes towards the right side. The right cricoarytenoid joint has a distinct appearance of healthiness and therefore likely of mobility. See below in the plan for a follow-up on addendum.                 PLAN:  1. To the operating room for an effort at airway widening that would include one or several of the following possibilities:  Left true vocal fold lateralization  Left true vocal fold arytenoid partial resection  Post cricoid flap reconstruction of the posterior Commissure mobilization of the right side if it is found to be ankylosed      Among others     I will use jet ventilation to start the case and we will probably convert to percutaneous jet ventilation early on so as to not encumber my access to the posterior commissure with a check catheter.   She will not be possible to jet ventilate for very long using laryngoscopic air sources given the narrowness of her glottic aperture.     I reviewed with the patient and her family the indications benefits limitations and risks of proceeding in this manner to their satisfaction. They reported being pleased with the outcome of the visit and being willing to proceed as recommended. I will get a CT scan of her neck to look into the soft tissues of the larynx with the hopes that I can visualize some degree of ankylosis on the left side and hopefully normal joint on the right side.     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 Plan is much more firmly anchored to the patient's cricoarytenoid anatomy now and I will proceed with what I believed to be a significantly higher chance of success with a transoral laryngoplasty and reconstruction.                          Kristel Garcia MD   Electronically signed 7/5/2022 at 5:45 PM  [address] [place] [address]   Physician Assistant   Specialty:  Physician Assistant   Progress Notes       Signed   Encounter Date:  7/5/2022                 Signed        Expand All Collapse All        Show:Clear all  [x]Manual[x]Template[x]Copied    Added by:  [x]JAKOB Ibrahim      []Jaqui for details        [facility]  Mick M[address] [place] [address]  Dept: [phone]  Dept Fax: [phone]  Loc: [phone]     Marcelina Lam is a 74 y.o. female who was referred by Unknown, Provider, MD for:       Chief Complaint   Patient presents with    New Patient       patient is here for persistent stridor bilateral true vocal fold immobility with significantly narrowed airway   .     HPI:      The patient presents for evaluation of breathing issues and stridor. The patient was reportedly intubated for a prolonged period back in 2004 after reported incident with her  with supposed laughing gas and chloroform.  Patient was reportedly dropped off at the hospital as a \"Naima Calderon\" and reportedly needed intubated. The patient was intubated for \"a while\" before being extubated. The patient reportedly had difficulty breathing for years after extubation, but in 2009 she worsened to the point that she needed a trach. The trach was in place for about a month before being decannulated. Patient has followed with a few ENTs for her breathing issues. She reports a few procedures being completed, but hasn't resolved her noisy and difficult breathing. She had pneumonia a few months ago, but miraculously didn't need intubated. She reportedly lost about 15-20 lbs during that time. She is gaining some weight back, but has only put 5-10 of that back on. She does have a remote history of bulimia reportedly, but hasn't had bulimia issues in many years reportedly. She reports occasional dizzy spells that last a brief period with extension of her neck, but none at rest. She denies any hemoptysis. No choking/coughing when eating reportedly. She has been diagnosed. Subjective:      REVIEW OF SYSTEMS:    A complete multi-organ review of systems was performed using a new patient questionnaire, and reviewed by me. ENT:  negative except as noted in HPI  CONSTITUTIONAL:  negative except as noted in HPI  EYES:  negative except as noted in HPI  RESPIRATORY:  negative except as noted in HPI  CARDIOVASCULAR:  negative except as noted in HPI  GASTROINTESTINAL:  negative except as noted in HPI  GENITOURINARY:  negative except as noted in HPI  MUSCULOSKELETAL:  negative except as noted in HPI  SKIN:  negative except as noted in HPI  ENDOCRINE/METABOLIC: negative except as noted in HPI  HEMATOLOGIC/LYMPHATIC:  negative except as noted in HPI  ALLERGY/IMMUN: negative except as noted in HPI  NEUROLOGICAL:  negative except as noted in HPI  BEHAVIOR/PSYCH:  negative except as noted in HPI     Past Medical History:  Past Medical History   History reviewed.  No pertinent past medical history.        Social History:    TOBACCO:   reports that she has never smoked. She has never used smokeless tobacco.  ETOH:   reports previous alcohol use. DRUGS:   has no history on file for drug use.     Family History:   Family History   History reviewed. No pertinent family history.        Surgical History:  Past Surgical History         Past Surgical History:   Procedure Laterality Date    THROAT SURGERY   2010            Objective:      This is a 74 y.o. female. Patient is alert and oriented to person, place and time. Patient appears chronically ill/borderline nourishment. Mood is happy with normal affect. Not obviously hearing impaired. Voice is weak with audible stridor.      /64 (Site: Left Upper Arm, Position: Sitting)   Pulse (!) 42   Temp (!) 96.1 °F (35.6 °C) (Infrared)   Resp 12   Ht 5' 2\" (1.575 m)   Wt 98 lb 9.6 oz (44.7 kg)   SpO2 92%   BMI 18.03 kg/m²      Head:              Normocephalic, atraumatic. No obvious masses or lesions noted.     Ears:  External ears: Normal: no scars, lesions or masses. Mastoid process: No erythema noted. No tenderness to palpation. R External auditory canal: clear and free of any pathology  L External auditory canal: clear and free of any pathology   Tympanic membranes:  R intact, translucent                                                        L intact, translucent  Nose:               External nose: Appears midline. No obvious deformity or masses. Septum:  relatively midline anteriorly, but does appear to mildly deviate posteriorly. No septal hematoma. No perforation. Mucosa:  clear  Turbinates: very hypertrophic and congested            Discharge:  clear     Mouth/Throat:  Lips, tongue and oral cavity: Normal. No masses or lesions noted   Dentition: good, no malocclusion  Oral mucosa: moist  Oropharynx: normal-appearing mucosa  Hard and soft palates: symmetrical and intact.   Salivary glands: not enlarged and no tenderness to palpation. Uvula: midline, no obvious lesions              Gag reflex is present.     Neck: Trachea midline. Thyroid not enlarged, no palpable masses or tenderness. Lymphatic: No cervical lymphadenopathy noted. Eyes: CRISTELA, EOM intact. Conjunctiva moist without discharge. Lungs: Normal effort of breathing, not obviously distressed. Stridor at rest with clear lung sounds. She is tachypnic on my exam, but seems to likely be her baseline. No distress noted/reported. Cardiac: HR 94 during my assessment. Normal rate and rhytm. Neuro: Cranial nerves II-XII grossly intact. Extremities: No clubbing, edema, or cyanosis noted.     Procedure: Fiberoptic laryngoscopy was performed in the office with Dr Niurka King. See his procedure note for further description of the findings.      Outside records reviewed.      Assessment/Plan:       Diagnosis Orders   1. Stridor  Miscellaneous Surgery     CT SOFT TISSUE NECK WO CONTRAST   2. History of tracheostomy  Miscellaneous Surgery     CT SOFT TISSUE NECK WO CONTRAST      The patient is a 74 y.o. female that presents for evaluation of stridor and shortness of breath. Dr Niurka King performed fiberoptic examination and recommended proceeding with surgery. She will be scheduled for transoral laryngoplasty, partial arytenoidectomy, left vocal cord lateralization and post-cricoid flap with Jet ventilation. Dr Niurka King and I discussed the potential risks/benefits of the procedure with the patient. Some of the risks discussed include (but not limited to): post-op pain, post-op infection, post-op bleeding, possibility of a trach (likely temporary), pneumothorax. Order for ct neck without contrast placed for surgical planning, which can hopefully be completed before surgery tomorrow.  The patient expresses understanding and would like to proceed.     (Please note that portions of this note may have been completed with a voice recognition program.  Efforts were made to edit the dictation but occasionally words are mis-transcribed.)     Electronically signed by JAKOB Osman on 7/5/2022 at 4:09 PM                                Office Visit on 7/5/2022           Office Visit on 7/5/2022                Detailed Report            Note shared with patient        Progress Notes Info    Author Note Status Last Update User Last Update Date/Time   JAKOB Osman Signed Fanny Osman 7/5/2022  4:09 PM     Chart Review Routing History    No routing history on file. Procedure visit on 7/5/2022           Procedure visit on 7/5/2022                Revision History                Detailed Report            Note shared with patient        Progress Notes Info    Author Note Status Last Update User Last Update Date/Time   Jerome Carroll MD Addendum Jerome Carroll MD 7/5/2022  8:00 PM     Chart Review Routing History    No routing history on file.

## 2022-07-06 NOTE — CONSULTS
CRITICAL CARE PROGRESS NOTE      Patient:  Felicia Houston    Unit/Bed:4B-02/002-A  YOB: 1950  MRN: [id number]   PCP: No primary care provider on file. Date of Admission: 7/6/2022  Chief Complaint:- chronic stridor and shortness of breath    Assessment and Plan:    1. Acute respiratory failure, hypoxia: 7/6/2022 patient is currently on high flow nasal cannula titrate to maintain SaO2 greater than 90%. 7/6/2022 chest x-ray no acute abnormality. 2. Stridor: 7/6/2022 status post transoral laryngoplasty left parietal arytenoidectomy left vocal cord lateralization. Jet ventilation catheter remains in place capped at this time. Postoperatively patient had a very slow emergence from general anesthesia with near unresponsiveness for over 1 hour post discontinuation of sedating medications. Patient arrived postoperatively with improvement. Continue to undergo neurological checks. 3. Anemia, normocytic:  Monitor and trend  4. HFpEF:  History, stable, monitor  5. Essential HPTN:  History, stable. INITIAL H AND P AND ICU COURSE:  Zac Ya is a 74year old  female admitted to [facility] ICU 7/6/2022 under the care of Dr. Mervat Paulino. Patient has a significant history of lifetime nonsmoker, essential hypertension, pulmonary hypertension, HFpEF-ECHO 4/2022 LVEF >70% mild left ventricular outflow tract obstruction, RV Systolic Pressure 47, mild to mod tricuspid regurgitation, chronic hoarseness, tracheal stenosis, bilateral vocal fold fixation post prolonged intubation back in 2004 attempted partial cordectomy with laser and temporary tracheostomy back in 2009 which she was decannulated, polypectomy, chronic stridor anxiety, bulimia. Mandy Chua presented to ENT clinic 7/5/2022 under the care of Dr. Mervat Paulino performed fiberoptic examination and recommended OR intervention. CT of Neck 7/5/2022 left vocal cord paralysis without definite causative lesion.   Degenerative changes of cervical spine most pronounced at C5-6 where there is on covertebral joint degenerative change and facet hypertrophy with minimal grade 1 retrolisthesis of C5 relative to C6. There is mild right and moderate to severe left neural foraminal stenosis at this level. 7/6/2022 patient did undergo transoral laryngoplasty left partial arytenoidectomy. Very slow emergence from general anesthesia with near unresponsiveness for over 1 hour post discontinuation of sedating medications. Patient was placed on 6 L per nasal cannula and transferred to  for further management and care. Past Medical History:  See HPI. Family History: Mother and Father unknown. Social History:  Single, lifetime nonsmoker, denies any ETOH or illicit drug use. ROS   GENERAL: Positive for fatgue  SKN: No lesions or rashes. HEAD: No headaches or recent injury  EYES: No acute changes in vision, no diplopia or blurred vision, no drainage  EARS: No hearing loss, no tinnitus, no drainage  NOSE/THROAT: No rhinorrhea or pharyngitis, no nasal drainage  NECK: No lumps or unusual neck stiffness  PULMONARY: Positive for shortness of breath, dyspnea  CARDIAC: Positive for bradycardia  GI: No history melena or hematochezia, no diarrhea or constipation  PERIPHERAL VASCULAR: No intermittent claudication or unusual leg cramps  MUSCULOSKELETAL: Occasional arthralgias, myalgias  NEUROLOGICAL: No Seizures or Syncope  HEMATOLOGIC:  No anemia, unusual bruising or bleeding  PSYCH: No homicidal or suicidal ideations    Scheduled Meds:  Continuous Infusions:   sodium chloride         PHYSICAL EXAMINATION:  T: 97.3. P: 92. RR: 18. B/P: 128/73. FiO2:  50 HFNC. O2 Sat:  100. I/O:  pending  Body mass index is 18.61 kg/m². GCS:   12  General:   Pale warm dry  HEENT:  normocephalic and atraumatic. No scleral icterus. PERR  Neck: supple. No Thyromegaly. Lungs: clear to auscultation.   No retractions, Jet Ventilation Catheter intact no redness drainage or swelling noted around insertion site  Cardiac: RRR-bradycardia. No JVD. Abdomen: soft. Nontender, flat, bowel sounds present  Extremities:  No clubbing, cyanosis, or edema x 4. Vasculature: capillary refill < 3 seconds. Palpable dorsalis pedis pulses. Skin:  warm and dry. Psych:  Alert and oriented x2. Affect appropriate  Lymph:  No supraclavicular adenopathy. Neurologic:  No focal deficit. No seizures. Data: (All radiographs, tracings, PFTs, and imaging are personally viewed and interpreted unless otherwise noted).  7/6/2022 1613 sodium 143 ionized calcium is 1.2 potassium 3.3 glucose is 102   7/5/2022 hemoglobin 13.5 macro 21.6   7/6/2022 ABG pH 7.35 PCO2 49 PO2 436 bicarb 27   7/6/2022 chest x-ray no evidence of pneumothorax. Mild cardiomegaly. No pulmonary infiltrates or effusions. The pulmonary vasculature is normal.   EKG heart rate 64     Cardiac telemetry sinus bradycardia        Seen with multidisciplinary ICU team yes. Meets Continued ICU Level Care Criteria:    [x] Yes   [] No - Transfer Planned to listed location:  [] HOSPITALIST CONTACTED-      Case and plan discussed with  [place]         Electronically signed by IRINEO Curtis - CNP  CRITICAL CARE SPECIALIST

## 2022-07-06 NOTE — PROGRESS NOTES
1612 pt arrived to PACU, unresponsive on arrival. ETT in place, 21 at the lips. VSS  1627 Dr Corey Fitzpatrick at bedside  [address] Reba Gurrola at bedside  [id number] pt responsive to verbal stimuli. Opens eyes briefly on command. VSS  1647 CO2 monitoring hooked up at this time  1658 pt grimacing and opening eyes on command but not following further commands at this time. VSS  1715 Dr Corey Fitzpatrick at bedside  1719 pt switched to spontaneous ventilation, tolerating well  1745 respiratory at bedside, pt extubated at this time and placed on 6L NC  1755 pt resting off and on in bed, tolerating nasal cannula well.  VSS  1827 meets criteria for discharge from PACU, transported to [facility]

## 2022-07-06 NOTE — PROGRESS NOTES
ADMITTED TO [facility] AND ORIENTED TO UNIT. SCDS ON. FALL AND ALLERGY BANDS ON. PT VERBALIZED APPROVAL FOR FIRST NAME, LAST INITIAL AND PHYSICIAN NAME ON UNIT WHITEBOARD.

## 2022-07-06 NOTE — PROGRESS NOTES
Dr. Rufina Terry at bedside speaking with patient and her family. Her son states that he has already deleted the video and would not tape further.

## 2022-07-06 NOTE — ANESTHESIA PRE PROCEDURE
Department of Anesthesiology  Preprocedure Note       Name:  Juan Webber   Age:  74 y.o.  :  1950                                          MRN:  [id number]         Date:  2022      Surgeon: Pam Hays):  Patricia Guzman MD    Procedure: Procedure(s):  Transoral Laryngoplasty Bilateral Partial Arrytenoidectomy, Left vocal cord lateralization and post cricoid flap    Medications prior to admission:   Prior to Admission medications    Medication Sig Start Date End Date Taking? Authorizing Provider   fluconazole (DIFLUCAN) 100 MG tablet TAKE 1 TABLET (100 MG) BY MOUTH DAILY FOR 3 DAYS. Patient not taking: Reported on 2022   Historical Provider, MD   metoprolol tartrate (LOPRESSOR) 25 MG tablet Take 25 mg by mouth 2 times daily 3/16/22 7/25/22  Historical Provider, MD   lisinopril (PRINIVIL;ZESTRIL) 10 MG tablet TAKE 1 TABLET BY MOUTH EVERY DAY  Patient not taking: Reported on 2022   Historical Provider, MD   budesonide (PULMICORT) 0.5 MG/2ML nebulizer suspension Inhale 0.5 mg into the lungs 3/16/22 4/29/23  Historical Provider, MD   amoxicillin-clavulanate (AUGMENTIN) 875-125 MG per tablet Take 1 tablet by mouth 2 times daily  Patient not taking: Reported on 2022 10/25/21   Historical Provider, MD   escitalopram (LEXAPRO) 10 MG tablet Take 10 mg by mouth  Patient not taking: Reported on 2022 10/25/21 10/26/22  Historical Provider, MD       Current medications:    Current Facility-Administered Medications   Medication Dose Route Frequency Provider Last Rate Last Admin    0.9 % sodium chloride infusion   IntraVENous Continuous Patricia Guzman MD           Allergies:     Allergies   Allergen Reactions    Ciprofloxacin      No reaction patient refuses to take it    Levaquin [Levofloxacin]      Patient refused to take mediation due to questionable side effects    Morphine Hallucinations    Statins Nausea And Vomiting       Problem List:    Patient Active Problem List   Diagnosis Code    Trachea, 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    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    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    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    Chronic stridor R06.1    Status post surgery Z98.890       Past Medical History:        Diagnosis Date    CHF exacerbation ([address]     due to pneumonia infection    Hypertension     Pneumonia        Past Surgical History:        Procedure Laterality Date    THROAT SURGERY  2010       Social History:    Social History     Tobacco Use    Smoking status: Never Smoker    Smokeless tobacco: Never Used   Substance Use Topics    Alcohol use: Not Currently                                Counseling given: Not Answered      Vital Signs (Current):   Vitals:    07/06/22 1113 07/06/22 1119   BP: (!) 196/85    Pulse: 68    Resp: 24    Temp: 98.5 °F (36.9 °C)    TempSrc: Temporal    SpO2: 99%    Weight:  98 lb 8 oz (44.7 kg)   Height:  5' 1\" (1.549 m)                                              BP Readings from Last 3 Encounters:   07/06/22 (!) 196/85   07/05/22 102/64   07/05/22 102/64       NPO Status: Time of last liquid consumption: 2345                        Time of last solid consumption: 2345                        Date of last liquid consumption: 07/05/22                        Date of last solid food consumption: 07/05/22    BMI:   Wt Readings from Last 3 Encounters:   07/06/22 98 lb 8 oz (44.7 kg)   07/05/22 98 lb 9.6 oz (44.7 kg)     Body mass index is 18.61 kg/m².     CBC:   Lab Results   Component Value Date/Time    WBC 8.5 07/05/2022 04:58 PM    RBC 4.31 07/05/2022 04:58 PM    HGB 13.5 07/05/2022 04:58 PM    HCT 41.6 07/05/2022 04:58 PM    MCV 96.5 07/05/2022 04:58 PM     07/05/2022 04:58 PM       CMP:   Lab Results   Component Value Date/Time     07/05/2022 04:58 PM    K 4.0 07/05/2022 04:58 PM    CL 97 07/05/2022 04:58 PM    CO2 33 07/05/2022 04:58 PM    BUN 14 07/05/2022 04:58 PM    CREATININE 0.5 07/05/2022 04:58 PM    LABGLOM >90 07/05/2022 04:58 PM    GLUCOSE 94 07/05/2022 04:58 PM    PROT 7.7 07/05/2022 04:58 PM    CALCIUM 10.0 07/05/2022 04:58 PM    BILITOT 0.3 07/05/2022 04:58 PM    ALKPHOS 70 07/05/2022 04:58 PM    AST 44 07/05/2022 04:58 PM    ALT 32 07/05/2022 04:58 PM       POC Tests: No results for input(s): POCGLU, POCNA, POCK, POCCL, POCBUN, POCHEMO, POCHCT in the last 72 hours. Coags: No results found for: PROTIME, INR, APTT    HCG (If Applicable): No results found for: PREGTESTUR, PREGSERUM, HCG, HCGQUANT     ABGs: No results found for: PHART, PO2ART, OKX3ODB, FLX9UTM, BEART, E8XREGXW     Type & Screen (If Applicable):  No results found for: LABABO, LABRH    Drug/Infectious Status (If Applicable):  No results found for: HIV, HEPCAB    COVID-19 Screening (If Applicable): No results found for: COVID19        Anesthesia Evaluation  Patient summary reviewed no history of anesthetic complications:   Airway: Mallampati: II  TM distance: >3 FB   Neck ROM: full  Mouth opening: > = 3 FB   Dental: normal exam         Pulmonary:normal exam                               Cardiovascular:    (+) hypertension:, CHF:, hyperlipidemia                  Neuro/Psych:               GI/Hepatic/Renal:             Endo/Other:                     Abdominal:             Vascular: Other Findings:           Anesthesia Plan      general     ASA 3     (Jet Ventilation)  Induction: intravenous. MIPS: Postoperative opioids intended and Prophylactic antiemetics administered. Anesthetic plan and risks discussed with patient, spouse and child/children.                         Myriam Menchaca MD   7/6/2022

## 2022-07-06 NOTE — PROGRESS NOTES
I noticed patient's son following around the room with his cell with was resting in his lap. I asked patient if he was taping me and he said yes. I said that he did not ask me and that he was not suppose to. He shrugged his shoulders like no big deal. I excused my self and spoke to [address], Valentino [place]. She states that it is against [address] and I need to ask him to stop and delete the video. I went back to the room and asked that he do no further video and please delete the other video. He got disgruntle and states \" I tell you what, you get Dr. Morena Ivan to tell me that and maybe I will. \" I said that I will send my manager in to speak to him.

## 2022-07-07 PROCEDURE — 6360000002 HC RX W HCPCS: Performed by: PHYSICIAN ASSISTANT

## 2022-07-07 PROCEDURE — 99024 POSTOP FOLLOW-UP VISIT: CPT | Performed by: PHYSICIAN ASSISTANT

## 2022-07-07 PROCEDURE — 2700000000 HC OXYGEN THERAPY PER DAY

## 2022-07-07 PROCEDURE — 2100000000 HC CCU R&B

## 2022-07-07 PROCEDURE — 2580000003 HC RX 258: Performed by: PHYSICIAN ASSISTANT

## 2022-07-07 PROCEDURE — 6360000002 HC RX W HCPCS: Performed by: OTOLARYNGOLOGY

## 2022-07-07 PROCEDURE — 6360000002 HC RX W HCPCS: Performed by: NURSE PRACTITIONER

## 2022-07-07 PROCEDURE — 6370000000 HC RX 637 (ALT 250 FOR IP): Performed by: NURSE PRACTITIONER

## 2022-07-07 PROCEDURE — 99232 SBSQ HOSP IP/OBS MODERATE 35: CPT | Performed by: INTERNAL MEDICINE

## 2022-07-07 PROCEDURE — 2580000003 HC RX 258: Performed by: NURSE PRACTITIONER

## 2022-07-07 PROCEDURE — 94761 N-INVAS EAR/PLS OXIMETRY MLT: CPT

## 2022-07-07 RX ORDER — ESCITALOPRAM OXALATE 10 MG/1
10 TABLET ORAL NIGHTLY
Status: DISCONTINUED | OUTPATIENT
Start: 2022-07-08 | End: 2022-07-12 | Stop reason: HOSPADM

## 2022-07-07 RX ORDER — LANOLIN ALCOHOL/MO/W.PET/CERES
3 CREAM (GRAM) TOPICAL NIGHTLY PRN
Status: DISCONTINUED | OUTPATIENT
Start: 2022-07-07 | End: 2022-07-12 | Stop reason: HOSPADM

## 2022-07-07 RX ORDER — LISINOPRIL 10 MG/1
10 TABLET ORAL DAILY
Status: DISCONTINUED | OUTPATIENT
Start: 2022-07-08 | End: 2022-07-11

## 2022-07-07 RX ADMIN — METOPROLOL TARTRATE 25 MG: 25 TABLET, FILM COATED ORAL at 20:18

## 2022-07-07 RX ADMIN — KETOROLAC TROMETHAMINE 15 MG: 30 INJECTION, SOLUTION INTRAMUSCULAR; INTRAVENOUS at 11:25

## 2022-07-07 RX ADMIN — KETOROLAC TROMETHAMINE 15 MG: 30 INJECTION, SOLUTION INTRAMUSCULAR; INTRAVENOUS at 03:50

## 2022-07-07 RX ADMIN — PIPERACILLIN AND TAZOBACTAM 3375 MG: 3; .375 INJECTION, POWDER, LYOPHILIZED, FOR SOLUTION INTRAVENOUS at 23:02

## 2022-07-07 RX ADMIN — PIPERACILLIN AND TAZOBACTAM 3375 MG: 3; .375 INJECTION, POWDER, LYOPHILIZED, FOR SOLUTION INTRAVENOUS at 06:14

## 2022-07-07 RX ADMIN — SODIUM CHLORIDE, PRESERVATIVE FREE 10 ML: 5 INJECTION INTRAVENOUS at 20:18

## 2022-07-07 RX ADMIN — Medication 3 MG: at 23:31

## 2022-07-07 RX ADMIN — KETOROLAC TROMETHAMINE 15 MG: 30 INJECTION, SOLUTION INTRAMUSCULAR; INTRAVENOUS at 20:22

## 2022-07-07 RX ADMIN — ENOXAPARIN SODIUM 30 MG: 100 INJECTION SUBCUTANEOUS at 09:17

## 2022-07-07 RX ADMIN — PIPERACILLIN AND TAZOBACTAM 3375 MG: 3; .375 INJECTION, POWDER, LYOPHILIZED, FOR SOLUTION INTRAVENOUS at 13:17

## 2022-07-07 ASSESSMENT — PAIN SCALES - GENERAL
PAINLEVEL_OUTOF10: 0
PAINLEVEL_OUTOF10: 0
PAINLEVEL_OUTOF10: 4
PAINLEVEL_OUTOF10: 0
PAINLEVEL_OUTOF10: 3
PAINLEVEL_OUTOF10: 4

## 2022-07-07 ASSESSMENT — PAIN DESCRIPTION - DESCRIPTORS
DESCRIPTORS: SORE
DESCRIPTORS: SORE

## 2022-07-07 ASSESSMENT — PAIN DESCRIPTION - LOCATION
LOCATION: THROAT
LOCATION: THROAT

## 2022-07-07 ASSESSMENT — PAIN - FUNCTIONAL ASSESSMENT
PAIN_FUNCTIONAL_ASSESSMENT: ACTIVITIES ARE NOT PREVENTED
PAIN_FUNCTIONAL_ASSESSMENT: ACTIVITIES ARE NOT PREVENTED

## 2022-07-07 ASSESSMENT — PAIN DESCRIPTION - PAIN TYPE
TYPE: SURGICAL PAIN
TYPE: SURGICAL PAIN

## 2022-07-07 ASSESSMENT — PAIN DESCRIPTION - FREQUENCY
FREQUENCY: INTERMITTENT
FREQUENCY: INTERMITTENT

## 2022-07-07 ASSESSMENT — PAIN DESCRIPTION - ONSET
ONSET: ON-GOING
ONSET: ON-GOING

## 2022-07-07 NOTE — CARE COORDINATION
DISCHARGE/PLANNING EVALUATION  7/7/22, 2:16 PM EDT    Reason for Referral: new HH  Mental Status: pt is alert and oriented   Decision Making: pt is capable of making own decisions   Family/Social/Home Environment: pt resides with her SO, Mike Michael in a one story home. Pt had been independent prior to admission. Pt is an active . Current Services including food security, transportation and housekeeping: see above  Current Equipment:none  Payment Source:[facility] Medicare  Concerns or Barriers to Discharge: none noted  Post acute provider list with quality measures, geographic area and applicable managed care information provided. Questions regarding selection process answered:would like HH in [place], [place]- requested SW contact pts PCP (Dr. Shonna Doshi [phone])    Teach Back Method used with pt regarding care plan and benefit of [facility]  Patient and son verbalize understanding of the plan of care and contribute to goal setting. Patient goals, treatment preferences and discharge plan: SW met with pt, son, Mike Yang and Merary Rush. Discussed discharge POC. Pt and family fly back to [place] 7/13/22. Pt plans on return home with family and would like [facility] for nursing services. Pt requested SW contact pts PCP Dr. Shonna Doshi [phone]. SW spoke with Patricio Jang with Dr. Miriam Torres office- she stated that Dr. Jennifer De Leon will follow for [facility] services- they are very familiar with pt. SW inquired about [facility] agencies. Patricio Jang provided this SW with 1. [employer] [facility] ([phone]) 2. [address] [place] [address] ([phone]) 3. [facility] ([phone]). SW completed referral with Tresa Valdez at [address]- faxed face sheet, H&P, OP note and meds.   SW will need to fax [facility] order once received along with discharge AVS.      Electronically signed by HELENA Jeffries on 7/7/2022 at 2:16 PM

## 2022-07-07 NOTE — PLAN OF CARE
Problem: Discharge Planning  Goal: Discharge to home or other facility with appropriate resources  7/7/2022 1646 by Tami Shabazz  Outcome: Progressing     Problem: Safety - Adult  Goal: Free from fall injury  Outcome: Progressing     Problem: Chronic Conditions and Co-morbidities  Goal: Patient's chronic conditions and co-morbidity symptoms are monitored and maintained or improved  Outcome: Progressing     Problem: Pain  Goal: Verbalizes/displays adequate comfort level or baseline comfort level  Outcome: Progressing     Care plan reviewed with patient. Patient verbalizes understanding of the plan of care and contributes to goal setting.

## 2022-07-07 NOTE — PROGRESS NOTES
Department of Otolaryngology  Progress Note    Chief Complaint:  Post-operative state    SUBJECTIVE: Patient is status post transoral laryngoplasty, left partial arytenoid ectomy, left vocal cord lateralization with Dr. Rufina Terry yesterday. Patient has been doing well today without any acute events overnight. She did have very prolonged emergence from anesthesia yesterday. Rapid response was called yesterday evening due to ST changes on EKG, but cardiology reviewed and did not have any acute concerns or any recommendations for intervention. Patient reports she is doing well today. She can feel improvement in her breathing already and she is extremely pleased with this. Patient remains afebrile. She is on high flow nasal cannula at this time. REVIEW OF SYSTEMS:    Negative unless stated in HPI    OBJECTIVE      Physical  VITALS:  BP (!) 145/83   Pulse 95   Temp 96.9 °F (36.1 °C) (Axillary)   Resp 17   Ht 5' 1\" (1.549 m)   Wt 98 lb 8 oz (44.7 kg)   SpO2 97%   BMI 18.61 kg/m²     Patient is 66-year-old male resting comfortably in hospital bed. Her work of breathing is significantly improved compared to preoperatively. Her ability to speak is also improved with less straining needed. No neck crepitus or edema is noted. Dr. Rufina Terry removed jet ventilation catheter and covered hole with folded 4 x 4 gauze and secured with tape. Patient was monitored afterward and she did not experience any respiratory distress.   Her voice improved slightly more with catheter removal.    Inpatient Medications  Current Facility-Administered Medications: 0.9 % sodium chloride infusion, , IntraVENous, Continuous  metoprolol tartrate (LOPRESSOR) tablet 25 mg, 25 mg, Oral, BID  piperacillin-tazobactam (ZOSYN) 3,375 mg in dextrose 5 % 50 mL IVPB (mini-bag), 3,375 mg, IntraVENous, Q8H  ketorolac (TORADOL) injection 15 mg, 15 mg, IntraVENous, Q6H PRN  sodium chloride flush 0.9 % injection 5-40 mL, 5-40 mL, IntraVENous, 2 times per day  sodium chloride flush 0.9 % injection 5-40 mL, 5-40 mL, IntraVENous, PRN  0.9 % sodium chloride infusion, , IntraVENous, PRN  enoxaparin Sodium (LOVENOX) injection 30 mg, 30 mg, SubCUTAneous, Q24H  ondansetron (ZOFRAN-ODT) disintegrating tablet 4 mg, 4 mg, Oral, Q8H PRN **OR** ondansetron (ZOFRAN) injection 4 mg, 4 mg, IntraVENous, Q6H PRN  polyethylene glycol (GLYCOLAX) packet 17 g, 17 g, Oral, Daily PRN  acetaminophen (TYLENOL) tablet 650 mg, 650 mg, Oral, Q6H PRN **OR** acetaminophen (TYLENOL) suppository 650 mg, 650 mg, Rectal, Q6H PRN    ASSESSMENT AND PLAN    Status post transoral laryngoplasty, left partial arytenoid ectomy, left vocal cord lateralization with Dr. Chantel Carlton 7/6/22    -Patient is doing well postoperatively. She is very pleased with the results of surgery already. Jet ventilation catheter removed bedside today  -Advance diet to minced and moist  -Progressive ambulation around patient room and unit as tolerated. Continuous pulse ox ordered   -Patient can be off high flow when she is ambulating and eating. Otherwise, HFNC should remain on (likely D/C it tomorrow). -Ok to wean FiO2 as tolerated  -If patient does well overnight, she can likely be transferred to stepdown tomorrow  -ENT holding surgery time for her tomorrow in case she were to worsen overnight. Discussed this with the patient and her family. Patient will not be made n.p.o. at this time, but she has been told to not eat her breakfast tray if she feels worse overnight/tomorrow than she does at this time.  If she continues to improve, she is able to eat her breakfast.     Patient was examined and plan discussed in coordination with Dr Chantel Carlton    Electronically signed by JAKOB Irizarry on 7/7/2022 at 2:45 PM

## 2022-07-07 NOTE — PROGRESS NOTES
CRITICAL CARE NOTE        Patient:  Terie Landau    Unit/Bed:4B-02/002-A  MRN: [id number]   PCP: No primary care provider on file. Date of Admission: 7/6/2022    Assessment and Plan(All pulmonary edema, renal failure, PE, and respiratory failure diagnoses are acute in nature unless otherwise specified):            1. 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 On HFNC with FiO3 % with SpO2 goal >90%. Wean off as tolerated. On Zosyn postsurgical, prophylactic. Trach care. Will follow up with Dr. Tamie Powell for further plan. 2. Acute Stridor/Chronic Tracheal stenosis: 7/6/2022 status post transoral laryngoplasty left parietal arytenoidectomy left vocal cord lateralization. Jet ventilation catheter remains in place capped at this time. Monitor. 3. Anemia, normocytic: Hb 10.6 from 13.5 on admission 7/5. Likely due to acute blood loss from surgery. Will monitor. Currently with no overt signs of bleed. 4. Chronic diastolic HF: TTE 5/7550 LVEF >70% mild left ventricular outflow tract obstruction, RV Systolic Pressure 47, mild to mod tricuspid regurgitation. No evidence of decompensation. Monitor. 5. Primary HTN: controlled. Home Lopressor resumed. Not taking Lisinopril as listed in home meds. CC: stridor  HPI: 73 yo F with history of chronic hoarseness, bilateral vocal fold fixation s/p intubation 2004 s/p partial cordectomy with laser, temporary tracheostomy 2009 s/p decannulation, with stridor and known tracheal stenosis. Presented for planned firberoptic exam with Dr. Tamie Powell. CT of Neck 7/5/2022 left vocal cord paralysis without definite causative lesion. 7/6/2022 patient did undergo transoral laryngoplasty left partial arytenoidectomy.  Had prolonged recovery from anesthesia, unresponsive for ~1 hr. Tansitioned to 6L NC.     ROS: reports no new symptoms including dyspnea, chest pain, dizziness/lightheadedness, nausea      Subjective (events over the past 24 hours):   No acute events overnight. Currently on HFNC FiO2 30%. Remained hemodynamically stable. Objective:  Vitals:  Temp: 97.3 °F (36.3 °C)  Heart Rate: 64  Resp: 20  BP: (!) 121/48  FiO2 : 30 % (weaned high SPO2 )  SpO2: 98 %  [place] Coma Scale  Eye Opening: Spontaneous  Best Verbal Response: Oriented  Best Motor Response: Obeys commands  [place] Coma Scale Score: 15    MAP: 79    Ventilator:  Vent Information  Vent Mode: PCV+  Resp Rate (Set): 10 bmp  FiO2 : 30 % (weaned high SPO2 )  PEEP/CPAP (cmH2O): 6  Pressure Ordered: 20 (P control 14)     Vent Patient Data  Rate Measured: 10 br/min  Vt Exhaled: 296 mL  Minute Volume: 3 Liters      Input/Output: In: 49.2   Out: 50 [Urine:50]    Intake/Output Summary (Last 24 hours) at 7/7/2022 0537  Last data filed at 7/7/2022 0433  Gross per 24 hour   Intake 49.22 ml   Output 50 ml   Net -0.78 ml   UOP: 0.1 cc/kg/hr  Net UOP since admission: +235   Last Bowel Movement:     [place]: No  Drains: No  Drips: No  Central Lines/Ports: No  Art Line:No   PICC Line: No   PIV left proximal anterior forearm 7/6  ETT: No    Medications:     sodium chloride      sodium chloride        metoprolol tartrate  25 mg Oral BID    piperacillin-tazobactam  3,375 mg IntraVENous Q8H    sodium chloride flush  5-40 mL IntraVENous 2 times per day    enoxaparin  30 mg SubCUTAneous Q24H         BMI:  Body mass index is 18.61 kg/m². Physical Exam:  General:  Tired appearing but no significant respiratory distress  HEENT:  normocephalic and atraumatic. No scleral icterus. EOMI  Neck: supple. Naun Gentleman in place with no surrounding edema, bleeding, or drainage  Lungs: clear to auscultation. No retractions. No crackles/wheezing  Cardiac: RRR. No JVD. Abdomen: soft. Nontender. Nondistended  Extremities:  No clubbing, cyanosis, or edema x 4. Vasculature: capillary refill < 3 seconds. Palpable dorsalis pedis pulses. Skin:  warm and dry. Psych:  Alert and oriented x3. Affect appropriate  Lymph:  No supraclavicular adenopathy. Neurologic:  No focal deficit. No seizures. Data: (All radiographs, tracings, PFTs, and imaging are personally viewed and interpreted unless otherwise noted).  Na 141, K 4.2, Cl 106, CO2 28, BUN 14, Cr 0.5, eGFR >90, Gluc 119   Hb 10.6, Hct 33.4      FEN/GI/DVT  IVF: None  Electrolytes: Monitor and replace per protocols  GI PPX: No  DVT Prophylaxis: SCDs  Diet:  Diet NPO     Meets Continued ICU Level Care Criteria: awaiting Dr. Mavis Calzada evaluation   [] Yes   [] No - Transfer Planned to listed location:     Case and plan discussed with Dr. Benito Rizvi. Alejandro Walsh MD  PGY3, Internal Medicine           Patient seen by me including key components of medical care. Case discussed with resident physician. Airway at risk. Continue with ICU care. Italicized font, if present,  represents changes to the note made by me. CC time 35 minutes. Time was discontiguous. Time does not include procedure. Time does include my direct assessment of the patient and coordination of care. Time represents more than 50% of the time involved with patient care by the [address] [place] [address] team.  Electronically signed by Ilsa Grossman.  Benito Rizvi MD.

## 2022-07-07 NOTE — PROGRESS NOTES
Patient ambulated in short with nurse and family. Ambulated 240 feet and tolerated well; gait unsteady at times. Patient continues to have upper airway stridor but family states it's improved and patient states not being short of breath which was an issue prior to surgery.

## 2022-07-07 NOTE — ANESTHESIA POSTPROCEDURE EVALUATION
Department of Anesthesiology  Postprocedure Note    Patient: Yovani Castro  MRN: [id number]  YOB: 1950  Date of evaluation: 7/6/2022      Procedure Summary     Date: 07/06/22 Room / Location: [address] [place]    Anesthesia Start: 1301 Anesthesia Stop: 4218    Procedure: Transoral Laryngoplasty Left Partial Arytenoidectomy, Left vocal cord lateralization and post cricoid flap (N/A ) Diagnosis:       Stridor      (Stridor [R06.1])    Surgeons: Carlos Lane MD Responsible Provider: Skye Fernandez DO    Anesthesia Type: general ASA Status: 3          Anesthesia Type: No value filed. Yoni Phase I: Yoni Score: 9    Yoni Phase II:        Anesthesia Post Evaluation    Patient location during evaluation: PACU  Patient participation: complete - patient participated  Level of consciousness: awake  Airway patency: patent  Nausea & Vomiting: no nausea  Complications: yes (unexpected prolonged emrgence)  Cardiovascular status: hemodynamically stable  Respiratory status: acceptable  Hydration status: stable  Comments: Patient with exceptionally prolonged emergence from anesthesia  All parameters checked and seemed to be within reasonable range  Once extubated no further issues.   Rapid response called after going to ICU and Dr Reginald Guzman reviewed EKG without concern

## 2022-07-07 NOTE — CARE COORDINATION
7/7/22, 11:12 AM EDT  DISCHARGE PLANNING EVALUATION:    Marcy Tan       Admitted: 7/6/2022/ [address] day: 1   Location: [facility]02/002-A Reason for admit: Stridor [R06.1]  Status post surgery [Z98.890]   PMH:  has a past medical history of CHF exacerbation ([address] Hypertension, and Pneumonia. Procedure:   7/6 Transoral Laryngoplasty Left Partial Arytenoidectomy, Left vocal cord lateralization  7/6 CXR:   1. No evidence of pneumothorax. 2. Mild cardiomegaly. 3. Endotracheal tube in place. 4. Atherosclerotic calcification in the aortic arch. 5. Otherwise negative chest x-ray. .     Barriers to Discharge: DA from surgery to ICU for post-op monitoring. On HFNC for heated humidification. On full liquid diet. Remains on HFNC, 40L, 30% FIO2, sats 97%. Afebrile. NSR. Ox4. ENT and Intensivist following. IVF, lovenox, prn IV toradol, IV zosyn. PCP: No primary care provider on file. Readmission Risk Score: 7 ( )%  Patient's Healthcare Decision Maker: Legal Next of Kin; however, she would like her son to be HCPOA. Spiritual Care consulted for Avni Villalobos paperwork assistance. Patient Goals/Plan/Treatment Preferences: Spoke with Tone Luna; states she lives at home with her S.O. and did not use any DME or have any HH services PTA. She cares for herself independently, and has a PCP - Dr. Jc Swanson in [place], [place]. Tone Luna states she does not drive much anymore, her  takes her to appointments. Tone Luna states she plans to return home at discharge and would like [facility] at discharge. SW consulted. Transportation/Food Security/Housekeeping Addressed:  No issues identified.

## 2022-07-07 NOTE — PLAN OF CARE
Problem: Respiratory - Adult  Goal: Ability to maintain adequate oxygenation will improve  Description: Ability to maintain adequate oxygenation will improve  Outcome: Progressing   Patient currently on HFNC for heat and humidity per Dr. Karin Amaral 30% FiO2 tolerating well.

## 2022-07-07 NOTE — FLOWSHEET NOTE
[place] [address] PROGRESS NOTE      Patient: Soto Clarke  Room #: 2Y-52/399-N            YOB: 1950  Age: 74 y.o. Gender: female            Admit Date & Time: 7/6/2022 10:36 AM    Assessment:    Interventions:    Outcomes:     Patient resting in bed, but came awake and alert when  entered. Patient obviously alert and oriented;  Patient quite courteous and self-assured;  Spoke of her decision for care and described her need some. When asked, Patient confidently and definitively named her only child , her son Oralia Johnson as her primary decision maker;  discussed some the [place] and that this fit those rules. Patient deferred other ACP conversation.  offered prayer and the patient accepted and reached out to take  hand. Patient offered the  a blessing as he left. Nurse asked to confirm patient choice and chart later. Plan:    1. Continue to visit and support. Electronically signed by Kiley Pink on 7/7/2022 at 7:44 PM.  [address] [facility]  [phone]               07/07/22 1941   Encounter Summary   Encounter Overview/Reason  Advance Care Planning   Service Provided For: Patient   Referral/Consult From: Physician;Nurse   Support System Significant other;Children   Last Encounter  07/07/22   Complexity of Encounter Moderate   Begin Time 1922   End Time  1943   Total Time Calculated 21 min   Spiritual/Emotional needs   Type Spiritual Support   Advance Care Planning   Type ACP conversation  (Named son as [facility]; deferred other choices)   Assessment/Intervention/Outcome   Assessment Calm;Coping; Hopeful;Peaceful

## 2022-07-07 NOTE — ACP (ADVANCE CARE PLANNING)
Advance Care Planning     Advance Care Planning Inpatient Note  Spiritual Care Department    Today's Date: 7/7/2022  Unit: [facility] 4B    Received request from IDT Member. Upon review of chart and communication with care team, patient's decision making abilities are not in question. . Patient was/were present in the room during visit. Goals of ACP Conversation:  Discuss advance care planning documents  Provide emotional and spiritual care to patients and their families    [place]:       [facility] [phone]    Summary:  Isiah Gonzalez  Documented Next of Kin, per patient report      Advance Care Planning Documents (Patient Wishes):  None  Son Petey definitively named primary decision maker by patient. Assessment:  Patient resting in bed, but came awake and alert when  entered. Patient obviously alert and oriented;  Patient quite courteous and self-assured;  Spoke of her decision for care and described her need some. When asked, Patient confidently and definitively named her only child , her son Antonio Lied as her primary decision maker;  discussed some the [place] and that this fit those rules. Patient deferred other ACP conversation.  offered prayer and the patient accepted and reached out to take  hand. Patient offered the  a blessing as he left. Interventions:  Discussed and provided education on state decision maker hierarchy  Deferred conversation as patient not interested in completing an advance directive at this time    Care Preferences Communicated:   No    Outcomes/Plan:  Decision maker named.      Electronically signed by Chaplain Mara on 7/7/2022 at 7:50 PM

## 2022-07-07 NOTE — PROGRESS NOTES
Physician Progress Note      PATIENT:               Juanjose Burgos  CSN #:                  [id number]  :                       1950  ADMIT DATE:       2022 10:36 AM  DISCH DATE:  RESPONDING  PROVIDER #:        Jessica Curtis MD          QUERY TEXT:    Patient admitted with BMI 18.61. If possible, please document in progress   notes and discharge summary if you are evaluating and /or treating any of the   following: The medical record reflects the following:  Risk Factors: BMI <19  Clinical Indicators: BMI documented at 18.61  Treatment: Daily weights, monitoring intake  Options provided:  -- Underweight  -- BMI not clinically significant  -- Other - I will add my own diagnosis  -- Disagree - Not applicable / Not valid  -- Disagree - Clinically unable to determine / Unknown  -- Refer to Clinical Documentation Reviewer    PROVIDER RESPONSE TEXT:    This patient?s BMI is not clinically significant.     Query created by: Carter Arrington on 2022 8:51 AM      Electronically signed by:  Jessica Curtis MD 2022 8:56 AM

## 2022-07-07 NOTE — PLAN OF CARE
Problem: Respiratory - Adult  Goal: Ability to maintain adequate oxygenation will improve  Description: Ability to maintain adequate oxygenation will improve  7/7/2022 0809 by Dwain Chavez RCP  Outcome: Progressing  7/7/2022 0517 by Shruthi Dennis RCP  Outcome: Progressing

## 2022-07-08 ENCOUNTER — APPOINTMENT (OUTPATIENT)
Dept: GENERAL RADIOLOGY | Age: 72
DRG: 140 | End: 2022-07-08
Attending: OTOLARYNGOLOGY
Payer: MEDICARE

## 2022-07-08 LAB
ANION GAP SERPL CALCULATED.3IONS-SCNC: 7 MEQ/L (ref 8–16)
BASOPHILS # BLD: 0.6 %
BASOPHILS ABSOLUTE: 0 THOU/MM3 (ref 0–0.1)
BUN BLDV-MCNC: 12 MG/DL (ref 7–22)
CALCIUM SERPL-MCNC: 8.1 MG/DL (ref 8.5–10.5)
CHLORIDE BLD-SCNC: 110 MEQ/L (ref 98–111)
CO2: 26 MEQ/L (ref 23–33)
CREAT SERPL-MCNC: 0.5 MG/DL (ref 0.4–1.2)
EOSINOPHIL # BLD: 0.9 %
EOSINOPHILS ABSOLUTE: 0.1 THOU/MM3 (ref 0–0.4)
ERYTHROCYTE [DISTWIDTH] IN BLOOD BY AUTOMATED COUNT: 13.8 % (ref 11.5–14.5)
ERYTHROCYTE [DISTWIDTH] IN BLOOD BY AUTOMATED COUNT: 51 FL (ref 35–45)
GFR SERPL CREATININE-BSD FRML MDRD: > 90 ML/MIN/1.73M2
GLUCOSE BLD-MCNC: 93 MG/DL (ref 70–108)
HCT VFR BLD CALC: 35.2 % (ref 37–47)
HCT VFR BLD CALC: 38.8 % (ref 37–47)
HEMOGLOBIN: 10.7 GM/DL (ref 12–16)
HEMOGLOBIN: 11.9 GM/DL (ref 12–16)
IMMATURE GRANS (ABS): 0.01 THOU/MM3 (ref 0–0.07)
IMMATURE GRANULOCYTES: 0.1 %
LYMPHOCYTES # BLD: 22.6 %
LYMPHOCYTES ABSOLUTE: 1.6 THOU/MM3 (ref 1–4.8)
MCH RBC QN AUTO: 31.3 PG (ref 26–33)
MCHC RBC AUTO-ENTMCNC: 30.7 GM/DL (ref 32.2–35.5)
MCV RBC AUTO: 102.1 FL (ref 81–99)
MONOCYTES # BLD: 8.2 %
MONOCYTES ABSOLUTE: 0.6 THOU/MM3 (ref 0.4–1.3)
NUCLEATED RED BLOOD CELLS: 0 /100 WBC
PLATELET # BLD: 193 THOU/MM3 (ref 130–400)
PMV BLD AUTO: 10.2 FL (ref 9.4–12.4)
POTASSIUM SERPL-SCNC: 3.9 MEQ/L (ref 3.5–5.2)
RBC # BLD: 3.8 MILL/MM3 (ref 4.2–5.4)
SEG NEUTROPHILS: 67.6 %
SEGMENTED NEUTROPHILS ABSOLUTE COUNT: 4.7 THOU/MM3 (ref 1.8–7.7)
SODIUM BLD-SCNC: 143 MEQ/L (ref 135–145)
WBC # BLD: 6.9 THOU/MM3 (ref 4.8–10.8)

## 2022-07-08 PROCEDURE — 2580000003 HC RX 258: Performed by: PHYSICIAN ASSISTANT

## 2022-07-08 PROCEDURE — 99232 SBSQ HOSP IP/OBS MODERATE 35: CPT | Performed by: INTERNAL MEDICINE

## 2022-07-08 PROCEDURE — 80048 BASIC METABOLIC PNL TOTAL CA: CPT

## 2022-07-08 PROCEDURE — 2580000003 HC RX 258: Performed by: STUDENT IN AN ORGANIZED HEALTH CARE EDUCATION/TRAINING PROGRAM

## 2022-07-08 PROCEDURE — 85025 COMPLETE CBC W/AUTO DIFF WBC: CPT

## 2022-07-08 PROCEDURE — 2700000000 HC OXYGEN THERAPY PER DAY

## 2022-07-08 PROCEDURE — 6370000000 HC RX 637 (ALT 250 FOR IP): Performed by: NURSE PRACTITIONER

## 2022-07-08 PROCEDURE — 6360000002 HC RX W HCPCS: Performed by: PHYSICIAN ASSISTANT

## 2022-07-08 PROCEDURE — 71045 X-RAY EXAM CHEST 1 VIEW: CPT

## 2022-07-08 PROCEDURE — 2100000000 HC CCU R&B

## 2022-07-08 PROCEDURE — 85014 HEMATOCRIT: CPT

## 2022-07-08 PROCEDURE — 85018 HEMOGLOBIN: CPT

## 2022-07-08 PROCEDURE — 2580000003 HC RX 258: Performed by: NURSE PRACTITIONER

## 2022-07-08 PROCEDURE — 99024 POSTOP FOLLOW-UP VISIT: CPT | Performed by: PHYSICIAN ASSISTANT

## 2022-07-08 PROCEDURE — 6360000002 HC RX W HCPCS: Performed by: OTOLARYNGOLOGY

## 2022-07-08 PROCEDURE — 36415 COLL VENOUS BLD VENIPUNCTURE: CPT

## 2022-07-08 PROCEDURE — 2500000003 HC RX 250 WO HCPCS: Performed by: NURSE PRACTITIONER

## 2022-07-08 PROCEDURE — 6360000002 HC RX W HCPCS: Performed by: NURSE PRACTITIONER

## 2022-07-08 PROCEDURE — 94761 N-INVAS EAR/PLS OXIMETRY MLT: CPT

## 2022-07-08 RX ORDER — SODIUM CHLORIDE FOR INHALATION 0.9 %
3 VIAL, NEBULIZER (ML) INHALATION 3 TIMES DAILY
Qty: 270 ML | Refills: 0 | Status: SHIPPED | OUTPATIENT
Start: 2022-07-08 | End: 2022-07-10

## 2022-07-08 RX ORDER — NEBULIZER ACCESSORIES
1 KIT MISCELLANEOUS 3 TIMES DAILY
Qty: 1 KIT | Refills: 0 | Status: SHIPPED | OUTPATIENT
Start: 2022-07-08 | End: 2022-07-10

## 2022-07-08 RX ORDER — ENALAPRILAT 2.5 MG/2ML
1.25 INJECTION INTRAVENOUS ONCE
Status: COMPLETED | OUTPATIENT
Start: 2022-07-08 | End: 2022-07-08

## 2022-07-08 RX ADMIN — PIPERACILLIN AND TAZOBACTAM 3375 MG: 3; .375 INJECTION, POWDER, LYOPHILIZED, FOR SOLUTION INTRAVENOUS at 14:38

## 2022-07-08 RX ADMIN — PIPERACILLIN AND TAZOBACTAM 3375 MG: 3; .375 INJECTION, POWDER, LYOPHILIZED, FOR SOLUTION INTRAVENOUS at 22:24

## 2022-07-08 RX ADMIN — PIPERACILLIN AND TAZOBACTAM 3375 MG: 3; .375 INJECTION, POWDER, LYOPHILIZED, FOR SOLUTION INTRAVENOUS at 05:56

## 2022-07-08 RX ADMIN — PSYLLIUM HUSK 1 PACKET: 3.4 GRANULE ORAL at 18:04

## 2022-07-08 RX ADMIN — ENOXAPARIN SODIUM 30 MG: 100 INJECTION SUBCUTANEOUS at 08:17

## 2022-07-08 RX ADMIN — SODIUM CHLORIDE: 9 INJECTION, SOLUTION INTRAVENOUS at 02:19

## 2022-07-08 RX ADMIN — KETOROLAC TROMETHAMINE 15 MG: 30 INJECTION, SOLUTION INTRAMUSCULAR; INTRAVENOUS at 20:27

## 2022-07-08 RX ADMIN — SODIUM CHLORIDE, PRESERVATIVE FREE 10 ML: 5 INJECTION INTRAVENOUS at 20:27

## 2022-07-08 RX ADMIN — METOPROLOL TARTRATE 25 MG: 25 TABLET, FILM COATED ORAL at 20:27

## 2022-07-08 RX ADMIN — ACETAMINOPHEN 650 MG: 325 TABLET ORAL at 08:11

## 2022-07-08 RX ADMIN — LISINOPRIL 10 MG: 10 TABLET ORAL at 09:33

## 2022-07-08 RX ADMIN — ENALAPRILAT 1.25 MG: 2.5 INJECTION INTRAVENOUS at 23:38

## 2022-07-08 RX ADMIN — SODIUM CHLORIDE: 9 INJECTION, SOLUTION INTRAVENOUS at 18:16

## 2022-07-08 ASSESSMENT — PAIN DESCRIPTION - PAIN TYPE
TYPE: ACUTE PAIN
TYPE: SURGICAL PAIN
TYPE: SURGICAL PAIN

## 2022-07-08 ASSESSMENT — PAIN DESCRIPTION - ORIENTATION
ORIENTATION: MID
ORIENTATION: ANTERIOR

## 2022-07-08 ASSESSMENT — PAIN DESCRIPTION - FREQUENCY
FREQUENCY: CONTINUOUS
FREQUENCY: CONTINUOUS

## 2022-07-08 ASSESSMENT — PAIN DESCRIPTION - DESCRIPTORS
DESCRIPTORS: SORE
DESCRIPTORS: SORE
DESCRIPTORS: DULL
DESCRIPTORS: SORE

## 2022-07-08 ASSESSMENT — PAIN SCALES - GENERAL
PAINLEVEL_OUTOF10: 0
PAINLEVEL_OUTOF10: 3
PAINLEVEL_OUTOF10: 4

## 2022-07-08 ASSESSMENT — PAIN DESCRIPTION - LOCATION
LOCATION: HEAD
LOCATION: THROAT

## 2022-07-08 ASSESSMENT — PAIN - FUNCTIONAL ASSESSMENT
PAIN_FUNCTIONAL_ASSESSMENT: ACTIVITIES ARE NOT PREVENTED

## 2022-07-08 ASSESSMENT — PAIN DESCRIPTION - ONSET
ONSET: ON-GOING
ONSET: ON-GOING

## 2022-07-08 NOTE — CARE COORDINATION
7/8/22, 11:05 AM EDT    DISCHARGE PLANNING EVALUATION    PC received from [address] [place] [address] [facility], states the are not in network and cannot accept referral.     THERESA [facility], no answer and no answering machine. THERESA called [facility], they are not in network. THERESA called pts PCP office and spoke with Lefty Serrano, [facility] to [facility]. THERESA called [facility] [phone], they are in network, sw faxed chart for agency to review.

## 2022-07-08 NOTE — PROGRESS NOTES
I stop by to see the patient in the unit to assess whether or not her return of stridor warranted intervention today or whether or not she has some chance of returning to her initial postop breathing comfort given additional time. The patient described multiple issues including being unable to tolerate the high flow nasal cannula stating that it made her feel like she \"just could not breathe\" with it on. In significant part the heated gas was at least part of the problem. On physical exam the patient was at ease and breathing at a normal rate. When she was not speaking she could breathe without stridor at all. When she attempted to speak, her stridor was directly related to the length of phrase she was planning on saying. On cervical auscultation, I was able to hear some rattling of secretions along with her stridor. I provoked her to intentionally cough and she clearly dislodge some of this material and could breathe more quietly. She still produce stridor when trying to speak in long phrases but it was nearly absent when speaking in 1 or 2 word sentences. Based on her history and these physical findings, she may need a early revision of her operation that would extend the lateralization as a second stage procedure. I would also work on some of the posterior supraglottic redundant mucosa to provide a better inlet for air between the arytenoid towers as an adjunctive surgical maneuver. I discussed this with the patient and her son and significant other to their satisfaction. I will check her in the morning before she has had breakfast and determine whether or not she is going to the operating room. If she has a good night and is feeling better in the morning, we will forestall that choice at least another few days to determine if she needs. I explained this in detail to the patient and her family to their satisfaction.   They reported being at ease with the plan and being willing to proceed as recommended. I also reviewed her care needs with her nursing staff including contingencies related to securing her airway if she has problems. Christina Mitchell.  Rufina Terry, [facility]  Cell: [phone]

## 2022-07-08 NOTE — PLAN OF CARE
Problem: Discharge Planning  Goal: Discharge to home or other facility with appropriate resources  7/8/2022 0021 by Sima Tejada RN  Outcome: Progressing  Flowsheets (Taken 7/8/2022 0021)  Discharge to home or other facility with appropriate resources:   Identify barriers to discharge with patient and caregiver   Identify discharge learning needs (meds, wound care, etc)   Refer to discharge planning if patient needs post-hospital services based on physician order or complex needs related to functional status, cognitive ability or social support system   Arrange for needed discharge resources and transportation as appropriate   Arrange for interpreters to assist at discharge as needed     Problem: Safety - Adult  Goal: Free from fall injury  7/8/2022 0021 by Sima Tejada RN  Outcome: Progressing  Flowsheets  Taken 7/8/2022 0021  Free From Fall Injury: Instruct family/caregiver on patient safety  Taken 7/8/2022 0020  Free From Fall Injury: Instruct family/caregiver on patient safety     Problem: Chronic Conditions and Co-morbidities  Goal: Patient's chronic conditions and co-morbidity symptoms are monitored and maintained or improved  7/8/2022 0021 by Sima Tejada RN  Outcome: Progressing  Flowsheets (Taken 7/8/2022 0021)  Care Plan - Patient's Chronic Conditions and Co-Morbidity Symptoms are Monitored and Maintained or Improved:   Monitor and assess patient's chronic conditions and comorbid symptoms for stability, deterioration, or improvement   Collaborate with multidisciplinary team to address chronic and comorbid conditions and prevent exacerbation or deterioration     Problem: Pain  Goal: Verbalizes/displays adequate comfort level or baseline comfort level  7/8/2022 0021 by Sima Tejada RN  Outcome: Progressing  Flowsheets (Taken 7/8/2022 0021)  Verbalizes/displays adequate comfort level or baseline comfort level:   Encourage patient to monitor pain and request assistance   Administer analgesics based on type and severity of pain and evaluate response   Consider cultural and social influences on pain and pain management   Assess pain using appropriate pain scale   Implement non-pharmacological measures as appropriate and evaluate response   Notify Licensed Independent Practitioner if interventions unsuccessful or patient reports new pain     Problem: ABCDS Injury Assessment  Goal: Absence of physical injury  Outcome: Progressing  Flowsheets (Taken 7/8/2022 0021)  Absence of Physical Injury: Implement safety measures based on patient assessment   Care plan reviewed with patient and family. Patient and family verbalize understanding of the plan of care and contribute to goal setting.

## 2022-07-08 NOTE — CARE COORDINATION
7/8/22, 1:32 PM EDT    DISCHARGE ON GOING [address] day: 2  Location: -02/002-A Reason for admit: Stridor [R06.1]  Status post surgery [Z98.890]   Procedure:   7/6 Transoral Laryngoplasty Left Partial Arytenoidectomy, Left vocal cord lateralization  7/6 CXR:   1. No evidence of pneumothorax. 2. Mild cardiomegaly. 3. Endotracheal tube in place. 4. Atherosclerotic calcification in the aortic arch. 5. Otherwise negative chest x-ray      Barriers to Discharge: High flow oxygen at 35% FiO2, 20 liters oxygen with saturations at 99%. IV fluids, Lovenox, IV Zosyn, pain control. PCP: No primary care provider on file. Readmission Risk Score: 9 ( )%  Patient Goals/Plan/Treatment Preferences: Return home with her sig other, new HH. SW following. Refer to THERESA note.

## 2022-07-08 NOTE — PROGRESS NOTES
CRITICAL CARE NOTE        Patient:  Pepe Dior    Unit/Bed:4B-02/002-A  MRN: [id number]   PCP: No primary care provider on file. Date of Admission: 7/6/2022    Assessment and Plan(All pulmonary edema, renal failure, PE, and respiratory failure diagnoses are acute in nature unless otherwise specified):            1. 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 On HFNC with FiO2 % with SpO2 goal >90%. Wean off as tolerated. On Zosyn postsurgical, prophylactic. S/p trach removal 7/7. Will follow up with Dr. Dolores Thomas for further plan. 2. Acute Stridor/Chronic Tracheal stenosis: 7/6/2022 status post transoral laryngoplasty left parietal arytenoidectomy left vocal cord lateralization. Jet ventilation catheter remains in place removed. Monitor. 3. Anemia, normocytic: Hb stable at 10-11, 13.5 on admission 7/5. Likely due to acute blood loss from surgery. Will monitor. Currently with no overt signs of bleed. 4. Chronic diastolic HF: TTE 2/5089 LVEF >70% mild left ventricular outflow tract obstruction, RV Systolic Pressure 47, mild to mod tricuspid regurgitation. No evidence of decompensation. Monitor. 5. Primary HTN: controlled. Home Lopressor resumed. Not taking Lisinopril as listed in home meds. CC: stridor  HPI: 75 yo F with history of chronic hoarseness, bilateral vocal fold fixation s/p intubation 2004 s/p partial cordectomy with laser, temporary tracheostomy 2009 s/p decannulation, with stridor and known tracheal stenosis. Presented for planned firberoptic exam with Dr. Dolores Thomas. CT of Neck 7/5/2022 left vocal cord paralysis without definite causative lesion. 7/6/2022 patient did undergo transoral laryngoplasty left partial arytenoidectomy.  Had prolonged recovery from anesthesia, unresponsive for ~1 hr. Tansitioned to 6L NC.     ROS: reports no new symptoms including dyspnea, chest pain, dizziness/lightheadedness, nausea      Subjective (events over the past 24 hours):   No acute events overnight. Currently on HFNC FiO2 30%. Remained hemodynamically stable. Caroline Jernigan removed per ENT 7/7. Reports her breathing feels better than prior. Has a stridor but states she has had it chronically. No new symptoms. Objective:  Vitals:  Temp: 99.2 °F (37.3 °C)  Heart Rate: 57  Resp: 17  BP: (!) 136/46  FiO2 : 35 %  SpO2: 98 %  Ramses Coma Scale  Eye Opening: Spontaneous  Best Verbal Response: Oriented  Best Motor Response: Obeys commands  Ramses Coma Scale Score: 15    MAP: 93    Ventilator:  Vent Information  Vent Mode: PCV+  Resp Rate (Set): 10 bmp  FiO2 : 35 %  PEEP/CPAP (cmH2O): 6  Pressure Ordered: 20 (P control 14)     Vent Patient Data  Rate Measured: 10 br/min  Vt Exhaled: 296 mL  Minute Volume: 3 Liters      Input/Output: In: 881.6   Out: 910 [Urine:910]    Intake/Output Summary (Last 24 hours) at 7/8/2022 0501  Last data filed at 7/8/2022 0353  Gross per 24 hour   Intake 832.38 ml   Output 860 ml   Net -27.62 ml   UOP: 0.7 cc/kg/hr  Net UOP since admission: +235   Last Bowel Movement:     [place]: No  Drains: No  Drips: No  Central Lines/Ports: No  Art Line:No   PICC Line: No   PIV left proximal anterior forearm 7/6  ETT: No    Medications:     sodium chloride 100 mL/hr at 07/08/22 0353    sodium chloride        escitalopram  10 mg Oral Nightly    lisinopril  10 mg Oral Daily    metoprolol tartrate  25 mg Oral BID    piperacillin-tazobactam  3,375 mg IntraVENous Q8H    sodium chloride flush  5-40 mL IntraVENous 2 times per day    enoxaparin  30 mg SubCUTAneous Q24H         BMI:  Body mass index is 20.83 kg/m².      Physical Exam:  General:  Tired appearing but no significant respiratory distress- looks better today  HEENT:  normocephalic and atraumatic. No scleral icterus. EOMI  Neck: supple. Marshell Forward in place with no surrounding edema, bleeding, or drainage  Lungs: clear to auscultation. No retractions. No crackles/wheezing  Cardiac: RRR. No JVD. Abdomen: soft. Nontender. Nondistended  Extremities:  No clubbing, cyanosis, or edema x 4. Vasculature: capillary refill < 3 seconds. Palpable dorsalis pedis pulses. Skin:  warm and dry. Psych:  Alert and oriented x3. Affect appropriate  Lymph:  No supraclavicular adenopathy. Neurologic:  No focal deficit. No seizures. Data: (All radiographs, tracings, PFTs, and imaging are personally viewed and interpreted unless otherwise noted).  Na 143, K 3.9, Cl 110, CO2 26, BUN 12, Cr 0.5, eGFR >90, Gluc 119   Ca 8.1   WBC 6.9, Hb 11.9/Hct 38.8, PLT 51      FEN/GI/DVT  IVF: None  Electrolytes: Monitor and replace per protocols  GI PPX: No  DVT Prophylaxis: SCDs  Diet:  ADULT DIET; Dysphagia - Minced and Moist     Meets Continued ICU Level Care Criteria: awaiting Dr. Rufina Terry evaluation   [] Yes   [] No - Transfer Planned to listed location:     Case and plan discussed with Dr. Esvin Conner. Diony Reyes MD  PGY3, Internal Medicine  Patient seen by me including key components of medical care. Case discussed with resident physician. Continue to monitor airway now that patient is decannulated. Italicized font, if present,  represents changes to the note made by me. CC time 25 minutes. Time was discontiguous. Time does not include procedure. Time does include my direct assessment of the patient and coordination of care. Time represents more than 50% of the time involved with patient care by the [address] [place] [address] team.  Electronically signed by Fredricka Aschoff.  Esvin Conner MD.

## 2022-07-08 NOTE — PROGRESS NOTES
Patient asked if she were not able to make decisions on her own, who she would have make those decisions. Patient states her son, Elaine Lopez would be the one to make medical decisions.

## 2022-07-08 NOTE — PROGRESS NOTES
Department of Otolaryngology  Progress Note    Chief Complaint:  S/p transoral laryngoplasty, left partial arytenoid ectomy, left vocal cord lateralization with Dr. Porfirio Oviedo 7/7/2022    SUBJECTIVE: No acute events overnight. Patient and her SO report that she had a very smooth night and patient feels she slept well. They both feel her breathing and voice are markedly improved. Her diet somehow was not resumed after surgery, so she has not eaten yet. Temporary emergency tracheostomy in place yet. Tolerating HFNC. REVIEW OF SYSTEMS:    Negative unless stated in HPI    OBJECTIVE      Physical  VITALS:  BP (!) 170/64   Pulse 67   Temp 98.9 °F (37.2 °C) (Oral)   Resp 17   Ht 5' 1\" (1.549 m)   Wt 110 lb 3.7 oz (50 kg)   SpO2 99%   BMI 20.83 kg/m²     On my arrival, patient is resting with eyes closed and breathing is completely quiet - no stridor or increased work of breathing. She awakens to name. There is mostly inspiratory phonatory stridor present, although this is markedly improved from prior. Suture on the temporary tracheostomy was clipped, ties unfastened and cannula removed without incident. Gentle pressure applied to site for 10 minutes; dressing of iodoform gauze, folded 2x2 and small mepilex applied. Instructed patient on how to gently press stoma site while talking or coughing.      Inpatient Medications  Current Facility-Administered Medications: escitalopram (LEXAPRO) tablet 10 mg, 10 mg, Oral, Nightly  lisinopril (PRINIVIL;ZESTRIL) tablet 10 mg, 10 mg, Oral, Daily  melatonin tablet 3 mg, 3 mg, Oral, Nightly PRN  0.9 % sodium chloride infusion, , IntraVENous, Continuous  metoprolol tartrate (LOPRESSOR) tablet 25 mg, 25 mg, Oral, BID  piperacillin-tazobactam (ZOSYN) 3,375 mg in dextrose 5 % 50 mL IVPB (mini-bag), 3,375 mg, IntraVENous, Q8H  ketorolac (TORADOL) injection 15 mg, 15 mg, IntraVENous, Q6H PRN  sodium chloride flush 0.9 % injection 5-40 mL, 5-40 mL, IntraVENous, 2 times per day  sodium chloride flush 0.9 % injection 5-40 mL, 5-40 mL, IntraVENous, PRN  0.9 % sodium chloride infusion, , IntraVENous, PRN  enoxaparin Sodium (LOVENOX) injection 30 mg, 30 mg, SubCUTAneous, Q24H  ondansetron (ZOFRAN-ODT) disintegrating tablet 4 mg, 4 mg, Oral, Q8H PRN **OR** ondansetron (ZOFRAN) injection 4 mg, 4 mg, IntraVENous, Q6H PRN  polyethylene glycol (GLYCOLAX) packet 17 g, 17 g, Oral, Daily PRN  acetaminophen (TYLENOL) tablet 650 mg, 650 mg, Oral, Q6H PRN **OR** acetaminophen (TYLENOL) suppository 650 mg, 650 mg, Rectal, Q6H PRN    ASSESSMENT AND PLAN    S/p transoral laryngoplasty, left partial arytenoid ectomy, left vocal cord lateralization with Dr. Aida King 7/7/2022    - Patient is doing well postoperatively. She is very pleased with the results of surgery already.    - Diet minced and moist  - Ambulate and up in chair as much as possible  - HFNC while resting and/or sleeping- this is for heated humidification, not oxygenation. Will need to ensure that her oxygenation is adequate on RA while ambulating prior to discharge. - No Lovenox due to post op hematoma; SCDs for DVT prophylaxis. - Transfer to ICU Stepdown today. Plan for discharge in the morning with clinic visit before returning to local [facility]. Management of patient's care was collaborated with Dr Aida King today.     Electronically signed by IRINEO Metzger - CNP on 7/8/2022 at 10:12 AM

## 2022-07-08 NOTE — PROGRESS NOTES
Department of Otolaryngology  Progress Note    Chief Complaint: Postoperative state    SUBJECTIVE: Patient is status post transoral laryngoplasty, left partial arytenoid ectomy, left vocal cord lateralization with Dr. Sadaf Benítez on 7/6/2022. Patient can tenuous to report she is very pleased with the results of surgery and feels as though her ease of breathing is improved. She feels that her breathing today is better than it was yesterday and significantly better compared to before surgery. Patient reportedly desaturated to 88% earlier this morning when high flow was removed. Patient has been complaining today that she feels warm. She states it is common for her to feel warm. Temperature check at time my exam was 99.4 °F.  Patient ate approximately 75% of her breakfast this morning without any issues per nursing staff. Patient reports minimal pain at this time and was reporting 3 out of 10 pain earlier to nursing staff. She states that this is very tolerable for her    REVIEW OF SYSTEMS:    A complete multi-organ review of systems was performed and reviewed by me.   ENT:  negative except as noted in HPI  CONSTITUTIONAL:  negative except as noted in HPI  EYES:  negative except as noted in HPI  RESPIRATORY:  negative except as noted in HPI  CARDIOVASCULAR:  negative except as noted in HPI  GASTROINTESTINAL:  negative except as noted in HPI  GENITOURINARY:  negative except as noted in HPI  MUSCULOSKELETAL:  negative except as noted in HPI  SKIN:  negative except as noted in HPI  ENDOCRINE/METABOLIC: negative except as noted in HPI  HEMATOLOGIC/LYMPHATIC:  negative except as noted in HPI  ALLERGY/IMMUN: negative except as noted in HPI  NEUROLOGICAL:  negative except as noted in HPI  BEHAVIOR/PSYCH:  negative except as noted in HPI    OBJECTIVE      Physical  VITALS:  BP (!) 146/54   Pulse 67   Temp 98.9 °F (37.2 °C) (Oral)   Resp 24   Ht 5' 1\" (1.549 m)   Wt 110 lb 3.7 oz (50 kg)   SpO2 100%   BMI 20.83 kg/m²     This is a 74 y.o. female. Patient is alert and oriented to person, place and time. She does not display evidence of respiratory distress and has high flow nasal cannula in place. However, she sounds more stridorous today than she did the day before, but is overall sounds improved compared to before surgery. Speech is breathy, which is also slightly worse compared to yesterday but overall improved. No palpable neck crepitus, edema, induration are noted with palpation. No overlying erythema of the neck is noted.     Data  Hemoglobin/Hematocrit:    Lab Results   Component Value Date/Time    HGB 10.7 07/08/2022 03:29 AM    HCT 35.2 07/08/2022 03:29 AM     BMP:    Lab Results   Component Value Date/Time     07/08/2022 03:29 AM    K 3.9 07/08/2022 03:29 AM     07/08/2022 03:29 AM    CO2 26 07/08/2022 03:29 AM    BUN 12 07/08/2022 03:29 AM    LABALBU 4.6 07/05/2022 04:58 PM    CREATININE 0.5 07/08/2022 03:29 AM    CALCIUM 8.1 07/08/2022 03:29 AM    LABGLOM >90 07/08/2022 03:29 AM    GLUCOSE 93 07/08/2022 03:29 AM       Inpatient Medications  Current Facility-Administered Medications: escitalopram (LEXAPRO) tablet 10 mg, 10 mg, Oral, Nightly  lisinopril (PRINIVIL;ZESTRIL) tablet 10 mg, 10 mg, Oral, Daily  melatonin tablet 3 mg, 3 mg, Oral, Nightly PRN  0.9 % sodium chloride infusion, , IntraVENous, Continuous  metoprolol tartrate (LOPRESSOR) tablet 25 mg, 25 mg, Oral, BID  piperacillin-tazobactam (ZOSYN) 3,375 mg in dextrose 5 % 50 mL IVPB (mini-bag), 3,375 mg, IntraVENous, Q8H  ketorolac (TORADOL) injection 15 mg, 15 mg, IntraVENous, Q6H PRN  sodium chloride flush 0.9 % injection 5-40 mL, 5-40 mL, IntraVENous, 2 times per day  sodium chloride flush 0.9 % injection 5-40 mL, 5-40 mL, IntraVENous, PRN  0.9 % sodium chloride infusion, , IntraVENous, PRN  enoxaparin Sodium (LOVENOX) injection 30 mg, 30 mg, SubCUTAneous, Q24H  ondansetron (ZOFRAN-ODT) disintegrating tablet 4 mg, 4 mg, Oral, Q8H PRN **OR** ondansetron (ZOFRAN) injection 4 mg, 4 mg, IntraVENous, Q6H PRN  polyethylene glycol (GLYCOLAX) packet 17 g, 17 g, Oral, Daily PRN  acetaminophen (TYLENOL) tablet 650 mg, 650 mg, Oral, Q6H PRN **OR** acetaminophen (TYLENOL) suppository 650 mg, 650 mg, Rectal, Q6H PRN    ASSESSMENT AND PLAN:    Status post transoral laryngoplasty, left partial arytenoid ectomy, left vocal cord lateralization with Dr. Aida King 7/6/22    -Patient reports continued symptomatic improvement in her breathing. On exam her stridor sounds mildly worse compared to yesterday, but does seem to be overall better than her initial presentation  -Continue high flow nasal cannula when resting in bed. Wean FiO2 as tolerated. High flow nasal cannula is mostly for humidification at this time. Okay to temporarily remove high flow nasal cannula for ambulation and eating, but should otherwise remain in place  -Progressive ambulation around patient room and unit as tolerated. Continuous pulse ox ordered   -Monitor patient's temperature closely for fever. Ordering CBC with dif to check WBC  -Prefer to keep patient in ICU today for monitoring due to worsened stridor on exam and patient reporting sensation of feeling warm    Patient's exam and treatment plan were discussed with Dr Aida King.     Electronically signed by JAKOB Tamayo on 7/8/2022 at 11:17 AM

## 2022-07-09 ENCOUNTER — APPOINTMENT (OUTPATIENT)
Dept: GENERAL RADIOLOGY | Age: 72
DRG: 140 | End: 2022-07-09
Attending: OTOLARYNGOLOGY
Payer: MEDICARE

## 2022-07-09 ENCOUNTER — ANESTHESIA (OUTPATIENT)
Dept: OPERATING ROOM | Age: 72
DRG: 140 | End: 2022-07-09
Payer: MEDICARE

## 2022-07-09 ENCOUNTER — ANESTHESIA EVENT (OUTPATIENT)
Dept: OPERATING ROOM | Age: 72
DRG: 140 | End: 2022-07-09
Payer: MEDICARE

## 2022-07-09 PROCEDURE — 0CQS8ZZ REPAIR LARYNX, VIA NATURAL OR ARTIFICIAL OPENING ENDOSCOPIC: ICD-10-PCS | Performed by: OTOLARYNGOLOGY

## 2022-07-09 PROCEDURE — 2580000003 HC RX 258: Performed by: PHYSICIAN ASSISTANT

## 2022-07-09 PROCEDURE — 3700000001 HC ADD 15 MINUTES (ANESTHESIA): Performed by: OTOLARYNGOLOGY

## 2022-07-09 PROCEDURE — 94760 N-INVAS EAR/PLS OXIMETRY 1: CPT

## 2022-07-09 PROCEDURE — 87186 SC STD MICRODIL/AGAR DIL: CPT

## 2022-07-09 PROCEDURE — 2709999900 HC NON-CHARGEABLE SUPPLY: Performed by: OTOLARYNGOLOGY

## 2022-07-09 PROCEDURE — 6360000002 HC RX W HCPCS: Performed by: NURSE ANESTHETIST, CERTIFIED REGISTERED

## 2022-07-09 PROCEDURE — 6360000002 HC RX W HCPCS: Performed by: PHYSICIAN ASSISTANT

## 2022-07-09 PROCEDURE — 87077 CULTURE AEROBIC IDENTIFY: CPT

## 2022-07-09 PROCEDURE — 88305 TISSUE EXAM BY PATHOLOGIST: CPT

## 2022-07-09 PROCEDURE — 3600000004 HC SURGERY LEVEL 4 BASE: Performed by: OTOLARYNGOLOGY

## 2022-07-09 PROCEDURE — 87147 CULTURE TYPE IMMUNOLOGIC: CPT

## 2022-07-09 PROCEDURE — 0CUS87Z SUPPLEMENT LARYNX WITH AUTOLOGOUS TISSUE SUBSTITUTE, VIA NATURAL OR ARTIFICIAL OPENING ENDOSCOPIC: ICD-10-PCS | Performed by: OTOLARYNGOLOGY

## 2022-07-09 PROCEDURE — 6370000000 HC RX 637 (ALT 250 FOR IP): Performed by: OTOLARYNGOLOGY

## 2022-07-09 PROCEDURE — 71045 X-RAY EXAM CHEST 1 VIEW: CPT

## 2022-07-09 PROCEDURE — 6370000000 HC RX 637 (ALT 250 FOR IP): Performed by: NURSE PRACTITIONER

## 2022-07-09 PROCEDURE — 3700000000 HC ANESTHESIA ATTENDED CARE: Performed by: OTOLARYNGOLOGY

## 2022-07-09 PROCEDURE — C1725 CATH, TRANSLUMIN NON-LASER: HCPCS | Performed by: OTOLARYNGOLOGY

## 2022-07-09 PROCEDURE — 87075 CULTR BACTERIA EXCEPT BLOOD: CPT

## 2022-07-09 PROCEDURE — 2100000000 HC CCU R&B

## 2022-07-09 PROCEDURE — 2700000000 HC OXYGEN THERAPY PER DAY

## 2022-07-09 PROCEDURE — 7100000000 HC PACU RECOVERY - FIRST 15 MIN: Performed by: OTOLARYNGOLOGY

## 2022-07-09 PROCEDURE — 6360000002 HC RX W HCPCS: Performed by: NURSE PRACTITIONER

## 2022-07-09 PROCEDURE — 3600000014 HC SURGERY LEVEL 4 ADDTL 15MIN: Performed by: OTOLARYNGOLOGY

## 2022-07-09 PROCEDURE — 87205 SMEAR GRAM STAIN: CPT

## 2022-07-09 PROCEDURE — 0C9S8ZZ DRAINAGE OF LARYNX, VIA NATURAL OR ARTIFICIAL OPENING ENDOSCOPIC: ICD-10-PCS | Performed by: OTOLARYNGOLOGY

## 2022-07-09 PROCEDURE — 87070 CULTURE OTHR SPECIMN AEROBIC: CPT

## 2022-07-09 PROCEDURE — 2500000003 HC RX 250 WO HCPCS: Performed by: NURSE ANESTHETIST, CERTIFIED REGISTERED

## 2022-07-09 PROCEDURE — 7100000001 HC PACU RECOVERY - ADDTL 15 MIN: Performed by: OTOLARYNGOLOGY

## 2022-07-09 PROCEDURE — 99291 CRITICAL CARE FIRST HOUR: CPT | Performed by: INTERNAL MEDICINE

## 2022-07-09 PROCEDURE — 0B9L8ZX DRAINAGE OF LEFT LUNG, VIA NATURAL OR ARTIFICIAL OPENING ENDOSCOPIC, DIAGNOSTIC: ICD-10-PCS | Performed by: OTOLARYNGOLOGY

## 2022-07-09 PROCEDURE — 2580000003 HC RX 258: Performed by: NURSE PRACTITIONER

## 2022-07-09 RX ORDER — HYDRALAZINE HYDROCHLORIDE 20 MG/ML
10 INJECTION INTRAMUSCULAR; INTRAVENOUS ONCE
Status: COMPLETED | OUTPATIENT
Start: 2022-07-09 | End: 2022-07-09

## 2022-07-09 RX ORDER — LABETALOL 20 MG/4 ML (5 MG/ML) INTRAVENOUS SYRINGE
10
Status: DISCONTINUED | OUTPATIENT
Start: 2022-07-09 | End: 2022-07-09 | Stop reason: HOSPADM

## 2022-07-09 RX ORDER — LIDOCAINE HYDROCHLORIDE 40 MG/ML
SOLUTION TOPICAL ONCE
Status: COMPLETED | OUTPATIENT
Start: 2022-07-09 | End: 2022-07-09

## 2022-07-09 RX ORDER — GLYCOPYRROLATE 1 MG/5 ML
SYRINGE (ML) INTRAVENOUS PRN
Status: DISCONTINUED | OUTPATIENT
Start: 2022-07-09 | End: 2022-07-09 | Stop reason: SDUPTHER

## 2022-07-09 RX ORDER — LIDOCAINE HYDROCHLORIDE 20 MG/ML
INJECTION, SOLUTION INTRAVENOUS PRN
Status: DISCONTINUED | OUTPATIENT
Start: 2022-07-09 | End: 2022-07-09 | Stop reason: SDUPTHER

## 2022-07-09 RX ORDER — LIDOCAINE HYDROCHLORIDE 20 MG/ML
JELLY TOPICAL PRN
Status: DISCONTINUED | OUTPATIENT
Start: 2022-07-09 | End: 2022-07-12 | Stop reason: HOSPADM

## 2022-07-09 RX ORDER — PROPOFOL 10 MG/ML
INJECTION, EMULSION INTRAVENOUS CONTINUOUS PRN
Status: DISCONTINUED | OUTPATIENT
Start: 2022-07-09 | End: 2022-07-09 | Stop reason: SDUPTHER

## 2022-07-09 RX ORDER — ONDANSETRON 2 MG/ML
INJECTION INTRAMUSCULAR; INTRAVENOUS PRN
Status: DISCONTINUED | OUTPATIENT
Start: 2022-07-09 | End: 2022-07-09 | Stop reason: SDUPTHER

## 2022-07-09 RX ORDER — SODIUM CHLORIDE 0.9 % (FLUSH) 0.9 %
5-40 SYRINGE (ML) INJECTION EVERY 12 HOURS SCHEDULED
Status: DISCONTINUED | OUTPATIENT
Start: 2022-07-09 | End: 2022-07-09 | Stop reason: HOSPADM

## 2022-07-09 RX ORDER — PROPOFOL 10 MG/ML
INJECTION, EMULSION INTRAVENOUS PRN
Status: DISCONTINUED | OUTPATIENT
Start: 2022-07-09 | End: 2022-07-09 | Stop reason: SDUPTHER

## 2022-07-09 RX ORDER — FENTANYL CITRATE 50 UG/ML
25 INJECTION, SOLUTION INTRAMUSCULAR; INTRAVENOUS ONCE
Status: COMPLETED | OUTPATIENT
Start: 2022-07-09 | End: 2022-07-09

## 2022-07-09 RX ORDER — SODIUM CHLORIDE 9 MG/ML
INJECTION, SOLUTION INTRAVENOUS PRN
Status: DISCONTINUED | OUTPATIENT
Start: 2022-07-09 | End: 2022-07-09 | Stop reason: HOSPADM

## 2022-07-09 RX ORDER — DEXAMETHASONE SODIUM PHOSPHATE 10 MG/ML
INJECTION, EMULSION INTRAMUSCULAR; INTRAVENOUS PRN
Status: DISCONTINUED | OUTPATIENT
Start: 2022-07-09 | End: 2022-07-09 | Stop reason: SDUPTHER

## 2022-07-09 RX ORDER — FENTANYL CITRATE 50 UG/ML
INJECTION, SOLUTION INTRAMUSCULAR; INTRAVENOUS PRN
Status: DISCONTINUED | OUTPATIENT
Start: 2022-07-09 | End: 2022-07-09 | Stop reason: SDUPTHER

## 2022-07-09 RX ORDER — FENTANYL CITRATE 50 UG/ML
25 INJECTION, SOLUTION INTRAMUSCULAR; INTRAVENOUS EVERY 5 MIN PRN
Status: DISCONTINUED | OUTPATIENT
Start: 2022-07-09 | End: 2022-07-09 | Stop reason: HOSPADM

## 2022-07-09 RX ORDER — SODIUM CHLORIDE 0.9 % (FLUSH) 0.9 %
5-40 SYRINGE (ML) INJECTION PRN
Status: DISCONTINUED | OUTPATIENT
Start: 2022-07-09 | End: 2022-07-09 | Stop reason: HOSPADM

## 2022-07-09 RX ORDER — FENTANYL CITRATE 50 UG/ML
50 INJECTION, SOLUTION INTRAMUSCULAR; INTRAVENOUS EVERY 5 MIN PRN
Status: DISCONTINUED | OUTPATIENT
Start: 2022-07-09 | End: 2022-07-09 | Stop reason: HOSPADM

## 2022-07-09 RX ORDER — ROCURONIUM BROMIDE 10 MG/ML
INJECTION, SOLUTION INTRAVENOUS PRN
Status: DISCONTINUED | OUTPATIENT
Start: 2022-07-09 | End: 2022-07-09 | Stop reason: SDUPTHER

## 2022-07-09 RX ORDER — EPHEDRINE SULFATE/0.9% NACL/PF 50 MG/5 ML
SYRINGE (ML) INTRAVENOUS PRN
Status: DISCONTINUED | OUTPATIENT
Start: 2022-07-09 | End: 2022-07-09 | Stop reason: SDUPTHER

## 2022-07-09 RX ORDER — LIDOCAINE HYDROCHLORIDE 20 MG/ML
JELLY TOPICAL PRN
Status: DISCONTINUED | OUTPATIENT
Start: 2022-07-09 | End: 2022-07-09

## 2022-07-09 RX ORDER — LIDOCAINE HYDROCHLORIDE 10 MG/ML
INJECTION, SOLUTION EPIDURAL; INFILTRATION; INTRACAUDAL; PERINEURAL
Status: DISCONTINUED
Start: 2022-07-09 | End: 2022-07-09 | Stop reason: WASHOUT

## 2022-07-09 RX ADMIN — ROCURONIUM BROMIDE 60 MG: 50 INJECTION, SOLUTION INTRAVENOUS at 11:00

## 2022-07-09 RX ADMIN — PROPOFOL 150 MCG/KG/MIN: 10 INJECTION, EMULSION INTRAVENOUS at 10:35

## 2022-07-09 RX ADMIN — LIDOCAINE HYDROCHLORIDE: 40 SOLUTION TOPICAL at 09:30

## 2022-07-09 RX ADMIN — METOPROLOL TARTRATE 25 MG: 25 TABLET, FILM COATED ORAL at 09:26

## 2022-07-09 RX ADMIN — SODIUM CHLORIDE, PRESERVATIVE FREE 10 ML: 5 INJECTION INTRAVENOUS at 20:29

## 2022-07-09 RX ADMIN — FENTANYL CITRATE 100 MCG: 50 INJECTION, SOLUTION INTRAMUSCULAR; INTRAVENOUS at 10:27

## 2022-07-09 RX ADMIN — ROCURONIUM BROMIDE 40 MG: 50 INJECTION, SOLUTION INTRAVENOUS at 10:45

## 2022-07-09 RX ADMIN — PIPERACILLIN AND TAZOBACTAM 3375 MG: 3; .375 INJECTION, POWDER, LYOPHILIZED, FOR SOLUTION INTRAVENOUS at 05:53

## 2022-07-09 RX ADMIN — PIPERACILLIN AND TAZOBACTAM 3375 MG: 3; .375 INJECTION, POWDER, LYOPHILIZED, FOR SOLUTION INTRAVENOUS at 14:46

## 2022-07-09 RX ADMIN — Medication 25 MG: at 10:37

## 2022-07-09 RX ADMIN — LISINOPRIL 10 MG: 10 TABLET ORAL at 09:26

## 2022-07-09 RX ADMIN — HYDRALAZINE HYDROCHLORIDE 10 MG: 20 INJECTION INTRAMUSCULAR; INTRAVENOUS at 01:10

## 2022-07-09 RX ADMIN — PIPERACILLIN AND TAZOBACTAM 3375 MG: 3; .375 INJECTION, POWDER, LYOPHILIZED, FOR SOLUTION INTRAVENOUS at 21:52

## 2022-07-09 RX ADMIN — SODIUM CHLORIDE: 9 INJECTION, SOLUTION INTRAVENOUS at 10:19

## 2022-07-09 RX ADMIN — SODIUM CHLORIDE, PRESERVATIVE FREE 10 ML: 5 INJECTION INTRAVENOUS at 08:48

## 2022-07-09 RX ADMIN — METOPROLOL TARTRATE 25 MG: 25 TABLET, FILM COATED ORAL at 20:30

## 2022-07-09 RX ADMIN — PROPOFOL 120 MG: 10 INJECTION, EMULSION INTRAVENOUS at 10:27

## 2022-07-09 RX ADMIN — FENTANYL CITRATE 25 MCG: 50 INJECTION, SOLUTION INTRAMUSCULAR; INTRAVENOUS at 01:00

## 2022-07-09 RX ADMIN — DEXAMETHASONE SODIUM PHOSPHATE 6 MG: 10 INJECTION, EMULSION INTRAMUSCULAR; INTRAVENOUS at 10:45

## 2022-07-09 RX ADMIN — ONDANSETRON 4 MG: 2 INJECTION INTRAMUSCULAR; INTRAVENOUS at 11:39

## 2022-07-09 RX ADMIN — Medication 0.2 MG: at 11:39

## 2022-07-09 RX ADMIN — LIDOCAINE HYDROCHLORIDE 40 MG: 20 INJECTION, SOLUTION INTRAVENOUS at 10:27

## 2022-07-09 RX ADMIN — ONDANSETRON 4 MG: 2 INJECTION INTRAMUSCULAR; INTRAVENOUS at 01:20

## 2022-07-09 RX ADMIN — SUGAMMADEX 200 MG: 100 INJECTION, SOLUTION INTRAVENOUS at 12:15

## 2022-07-09 RX ADMIN — Medication 25 MG: at 10:35

## 2022-07-09 ASSESSMENT — PAIN SCALES - WONG BAKER: WONGBAKER_NUMERICALRESPONSE: 0

## 2022-07-09 NOTE — OP NOTE
Operative Note      Patient: Trino Farah  YOB: 1950  MRN: [id number]    Date of Procedure: 7/9/2022    Pre-Op Diagnosis: Stridor [R06.1]    Post-Op Diagnosis: Same       Procedure(s):  SUSPENSION LARYNGOSCOPY, STAGE 2 LARYNGOPLASTY, EVACUATION HEMOTOMA, REMOVAL OBSTRUCTING FIBRINOUS DEBRIS, BRONCHEOAVELAR LAVAGE    Surgeon(s):  Lucita Choudhury MD    Assistant:   * No surgical staff found *    Anesthesia: General    Estimated Blood Loss (mL): less than 50     Complications: None    Specimens:   ID Type Source Tests Collected by Time Destination   1 : Airway Sputum Tissue Larynx GRAM STAIN, CULTURE, ANAEROBIC AND AEROBIC Lucita Choudhury MD 7/9/2022 1130    2 : BAL - Bilateral Body Fluid Lung GRAM STAIN (Canceled), CULTURE, AEROBIC (Canceled) Lucita Choudhury MD 7/9/2022 1205    A : Fibrinous Debris - Larynx Tissue Larynx SURGICAL PATHOLOGY Lucita Choudhury MD 7/9/2022 1055    B : Left Arytenoid Tissue Larynx SURGICAL PATHOLOGY Lucita Choudhury MD 7/9/2022 1129    C : Left Supraglottis & Arytenoid [place] Tissue Larynx SURGICAL PATHOLOGY Lucita Choudhury MD 7/9/2022 1138        Implants:  * No implants in log *      Drains:   [REMOVED] NG/OG/NJ/NE Tube (Removed)       [REMOVED] Urinary Catheter (Removed)       Findings: 1. Fibrinous material obstructing supraglottic airway and attached to supraglottic wound was swollen consistent with hematoma  2. Wound opened and hematoma discovered and evacuated  3. Revision arytenoidectomy performed   4. Stage II laryngoplasty in the form of supraglottoplasty performed to further improve airflow  5. Bronchoalveolar lavage performed for cultures and to cleanse tracheobronchial tree in a manner consistent with oxygen needs and risk of pneumonia.     Surgical images:  Initial view on laryngoscopy    Fibrinous debris attached to the supraglottis on the left side    Same    Fibrinous debris removed and supraglottic edema as signs of hematoma noted    Tracheal image in preparation for jet ventilation catheter    More fibrinous debris noted and removed    Side of prior to ventilation catheter placement    New catheter placed    Verified    Supraglottic edema in preparation for opening the wound    Same        Airway below supraglottic edema wide open with intact lateralization of left arytenoid    View post revision supraglottoplasty    Wide open glottis        Detailed Description of Procedure: The patient was taken to the operating room emergently to treat worsening stridor after bedside laryngoscopy demonstrated fibrinous debris obstructing airway. The patient was placed in the supine position. General anesthesia was induced and the patient was intubated with an LMA without difficulty and with good air exchange. The table was turned 90 degrees for the procedure. I placed her custom made heat activated dental guard in place to protect her dentition. I performed a timeout verifying the patient's identity and planned procedure. Suspension laryngoscopy and removal of obstructing foreign body: I removed the LMA and replaced it with the Yamileth laryngoscope and the patient's vallecula to extend the airway anteriorly and provide a direct line of sight view the airway. Jet ventilation was commenced. I used a 30 degree optical telescope to carefully examined the airway and found a significant amount of the obstruction to be related to a pedunculated obstructive mass of fibrinous debris attached to the supraglottic wound. It was clearly impeding airflow though her saturations were staying at near 100 with the jet ventilation that was commenced. I used grasping forceps to remove this material handing off a representative portion of it to be placed in formalin for permanent section. I cleared all of it away and then could see the remainder of the airway pathology.   All of this was done while ventilating the patient from above through the laryngoscope. I then used a 30 degree optical telescope to place a transtracheal percutaneous jet ventilation catheter through the neck skin in the usual manner. The catheter was placed in the distal trachea without difficulty or vital sign instability. It was secured to the patient's neck with the Velcro ties. It was used for the remainder of the operation. Stage II laryngoplasty with supraglottoplasty and revision arytenoidectomy: I used alligator forceps to grasp the clips on the 2 sutures closing the patient's supraglottis from the original operation. After removing them I opened the wound and found clotted blood in the form of a hematoma of the supraglottis causing the supraglottis to bulge medially and contributing to the obstruction of her airway. I evacuated the clot and look for active bleeding. There was none. There was residual arytenoid cartilage in the supraglottic space that I removed to make additional space for lateralization of the supraglottic mucosa. I handed this tissue off to be placed in formalin for permanent section. I removed it with cup forceps. I used suction cautery to achieve a definitive hemostasis after this instrumentation. I then turned my attention to a revision supraglottoplasty. I checked to make sure that the anchoring of the lateralization sutures with the arytenoid lateralization procedure was in good stead. It was firmly in place. I then brought onto the field and operating microscope with UltraPulse CO2 laser and stated that 1 mm circular configuration with 0 delay. I used suction grasping forceps to remove a large portion of the posterior and posterior lateral supraglottic soft tissues that were enabling the obstruction of the patient's airway from the hematoma. I lasered these soft tissues through from posterior to anterior and removed all of the redundant mucosa in the process. I took this all the way that to the interarytenoid space.   I then generated a advancement flap of tissue from this residual supraglottic and piriform sinus tissue to reconstruct the supraglottic larynx. I placed 2 figure-of-eight 4-0 Monocryl sutures on a P3 cutting needle with the medial suture being placed first and then a superior lateral suture. With both sutures anchored in place the supraglottic larynx was pulled posterior laterally to widen the airway aperture significantly. I clipped both of the sutures and laser lysed the excess suture material without difficulty. This markedly improved the patient's airway. Bronchoalveolar lavage: I then turned my attention to cleansing the distal airway. I had earlier suctioned some of the mucopurulence within the trachea and sent it for gram stain and culture. I obtained a flexible bronchoscope to continue the cleansing into the distal airway to make sure that her lobar and segmental bronchi were clear of obstructing matter. I instilled 20 cc of saline in for 5 cc aliquots, 2 in the left and 2 in the right to cleanse the lumbar structures and suction out representative portions of that material to capture in a Luken's trap. This was done without difficulty. No obvious mucous plugs were encountered. As such I consider the operation concluded. I reintubated the patient with a 6.0 endotracheal tube using endoscopic guidance to avoid disrupting the supraglottic laryngoplasty. I commenced close circuit ventilation and the patient's CO2 was in the teens consistent with very adequate exchange throughout the case. I remove the transoral hardware and turned the patient back to anesthesia for reversal and extubation in the operating room. This was carried out without incident and the patient was taken recovery in satisfactory condition. Estimated blood loss in the case was less than 50 cc including the blood evacuated from the hematoma. The patient received less than a liter of intravenous lactated Ringer's intraoperatively. No blood products were transfused. No intraoperative complications were detected. Counts were considered accurate x3. I was present for and performed the patient's entire operation myself. Disposition: The patient will be taken to the PACU for close airway observation to make sure she is stable for transfer back to the unit. Once in the unit all of her medical care will be reinstituted with the exception of the Lovenox which will be held given that this is effectively a complication of the anticoagulation effect of that medication that was empirically used to reduce her risk of DVT formation. We will have to rely purely on lower extremity pneumatic devices in her case. Dante Elliott.  Loc Jackson MD  Cell: [phone]    Electronically signed by Sabrina Espinoza MD on 7/9/2022 at 12:59 PM

## 2022-07-09 NOTE — PLAN OF CARE
Problem: Discharge Planning  Goal: Discharge to home or other facility with appropriate resources  7/9/2022 0810 by Anita Morrow RN  Outcome: Progressing  Note: Communication with treatment team and patient about discharge plan and appropriate resources. Problem: Safety - Adult  Goal: Free from fall injury  7/9/2022 0810 by Anita Morrow RN  Outcome: Progressing  Note: The patient has been assessed for falls and the room room has been assessed for hazards to safety. Hand hygeine has been performed upon additional hazards to their safety. Will continue to monitor and assess throughout my hourly rounding. Problem: Pain  Goal: Verbalizes/displays adequate comfort level or baseline comfort level  7/9/2022 0810 by Anita Morrow RN  Outcome: Progressing  Note: Patient displays improved well-being such as baseline levels for pulse, BP, respirations and relaxed muscle tone or body posture. Monitor using 0-10 pain scale.       Problem: Chronic Conditions and Co-morbidities  Goal: Patient's chronic conditions and co-morbidity symptoms are monitored and maintained or improved  7/9/2022 0810 by Anita Morrow RN  Outcome: Progressing  Care Plan - Patient's Chronic Conditions and Co-Morbidity Symptoms are Monitored and Maintained or Improved:   Monitor and assess patient's chronic conditions and comorbid symptoms for stability, deterioration, or improvement   Collaborate with multidisciplinary team to address chronic and comorbid conditions and prevent exacerbation or deterioration

## 2022-07-09 NOTE — ANESTHESIA PRE PROCEDURE
Daily Angella DILLON TerrencezeinaIRINEO CNP   1 packet at 07/08/22 1804    escitalopram (LEXAPRO) tablet 10 mg  10 mg Oral Nightly IRINEO Ramirez CNP        lisinopril (PRINIVIL;ZESTRIL) tablet 10 mg  10 mg Oral Daily IRINEO Ramirez - CNP   10 mg at 07/09/22 0926    melatonin tablet 3 mg  3 mg Oral Nightly PRN IRINEO Ramirez - CNP   3 mg at 07/07/22 2331    metoprolol tartrate (LOPRESSOR) tablet 25 mg  25 mg Oral BID IRINEO Ramirez - CNP   25 mg at 07/09/22 0926    piperacillin-tazobactam (ZOSYN) 3,375 mg in dextrose 5 % 50 mL IVPB (mini-bag)  3,375 mg IntraVENous Q8H JAKBO Henao   Stopped at 07/09/22 0630    ketorolac (TORADOL) injection 15 mg  15 mg IntraVENous Q6H PRN Todd Robertson MD   15 mg at 07/08/22 2027    sodium chloride flush 0.9 % injection 5-40 mL  5-40 mL IntraVENous 2 times per day IRINEO Ramirez - CNP   10 mL at 07/09/22 0848    sodium chloride flush 0.9 % injection 5-40 mL  5-40 mL IntraVENous PRN IRINEO Ramirez - CNP        0.9 % sodium chloride infusion   IntraVENous PRN IRINEO Ramirez - CNP        enoxaparin Sodium (LOVENOX) injection 30 mg  30 mg SubCUTAneous Q24H IRINEO Ramirez - CNP   30 mg at 07/08/22 0817    ondansetron (ZOFRAN-ODT) disintegrating tablet 4 mg  4 mg Oral Q8H PRN IRINEO Ramirez CNP        Or    ondansetron (ZOFRAN) injection 4 mg  4 mg IntraVENous Q6H PRN IRINEO Ramirez - CNP   4 mg at 07/09/22 0120    polyethylene glycol (GLYCOLAX) packet 17 g  17 g Oral Daily PRN IRINEO Ramirez - CNP        acetaminophen (TYLENOL) tablet 650 mg  650 mg Oral Q6H PRN IRINEO Ramirez - CNP   650 mg at 07/08/22 5133    Or    acetaminophen (TYLENOL) suppository 650 mg  650 mg Rectal Q6H PRN IRINEO Ramirez - CNP Allergies:     Allergies   Allergen Reactions    Ciprofloxacin      No reaction patient refuses to take it    Levaquin [Levofloxacin]      Patient refused to take mediation due to questionable side effects    Morphine Hallucinations    Statins Nausea And Vomiting       Problem List:    Patient Active Problem List   Diagnosis Code    Trachea, 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    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    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    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    Chronic stridor R06.1    Status post surgery Z98.890    Stridor R06.1       Past Medical History:        Diagnosis Date    CHF exacerbation ([facility] [address]     due to pneumonia infection    Hypertension     Pneumonia        Past Surgical History:        Procedure Laterality Date    LARYNGOSCOPY N/A 7/6/2022    Transoral Laryngoplasty Left Partial Arytenoidectomy, Left vocal cord lateralization and post cricoid flap performed by Renu Escalante MD at [facility]  2010       Social History:    Social History     Tobacco Use    Smoking status: Never Smoker    Smokeless tobacco: Never Used   Substance Use Topics    Alcohol use: Not Currently                                Counseling given: Not Answered      Vital Signs (Current):   Vitals:    07/09/22 0735 07/09/22 0741 07/09/22 0800 07/09/22 0900   BP: (!) 153/64  (!) 180/62 (!) 159/68   Pulse: 90 88 96 89   Resp: 18 24 20 19   Temp: 98.3 °F (36.8 °C)      TempSrc: Oral      SpO2: 99% 98% 94% 99%   Weight:       Height:                                                  BP Readings from Last 3 Encounters:   07/09/22 (!) 159/68   07/05/22 102/64   07/05/22 102/64       NPO Status: Time of last liquid consumption: 2345                        Time of last solid consumption: 2345                        Date of last liquid consumption: 07/05/22                        Date of last solid food consumption: 07/05/22    BMI:   Wt Readings from Last 3 Encounters:   07/08/22 110 lb 3.7 oz (50 kg) 07/05/22 98 lb 9.6 oz (44.7 kg)     Body mass index is 20.83 kg/m². CBC:   Lab Results   Component Value Date/Time    WBC 6.9 07/08/2022 01:50 PM    RBC 3.80 07/08/2022 01:50 PM    HGB 11.9 07/08/2022 01:50 PM    HCT 38.8 07/08/2022 01:50 PM    .1 07/08/2022 01:50 PM     07/08/2022 01:50 PM       CMP:   Lab Results   Component Value Date/Time     07/08/2022 03:29 AM    K 3.9 07/08/2022 03:29 AM     07/08/2022 03:29 AM    CO2 26 07/08/2022 03:29 AM    BUN 12 07/08/2022 03:29 AM    CREATININE 0.5 07/08/2022 03:29 AM    LABGLOM >90 07/08/2022 03:29 AM    GLUCOSE 93 07/08/2022 03:29 AM    PROT 7.7 07/05/2022 04:58 PM    CALCIUM 8.1 07/08/2022 03:29 AM    BILITOT 0.3 07/05/2022 04:58 PM    ALKPHOS 70 07/05/2022 04:58 PM    AST 44 07/05/2022 04:58 PM    ALT 32 07/05/2022 04:58 PM       POC Tests:   Recent Labs     07/06/22  1603   POCGLU 87       Coags: No results found for: PROTIME, INR, APTT    HCG (If Applicable): No results found for: PREGTESTUR, PREGSERUM, HCG, HCGQUANT     ABGs: No results found for: PHART, PO2ART, BOW0LUW, GXC7ZML, BEART, O8ZSZWYD     Type & Screen (If Applicable):  No results found for: LABABO, LABRH    Drug/Infectious Status (If Applicable):  No results found for: HIV, HEPCAB    COVID-19 Screening (If Applicable): No results found for: COVID19        Anesthesia Evaluation    Airway: Mallampati: II  TM distance: >3 FB   Neck ROM: full  Mouth opening: > = 3 FB   Dental:          Pulmonary:   (+) decreased breath sounds                            Cardiovascular:    (+) hypertension:, CHF:,         Rhythm: regular                      Neuro/Psych:               GI/Hepatic/Renal:             Endo/Other:                     Abdominal:             Vascular: Other Findings:           Anesthesia Plan      general     ASA 3     (Pt. Has stridor)  Induction: intravenous. Anesthetic plan and risks discussed with patient and spouse.       Plan discussed with Toi Cornell MD   7/9/2022

## 2022-07-09 NOTE — PLAN OF CARE
Problem: Discharge Planning  Goal: Discharge to home or other facility with appropriate resources  Outcome: Progressing  Flowsheets (Taken 7/8/2022 2228)  Discharge to home or other facility with appropriate resources:   Identify barriers to discharge with patient and caregiver   Arrange for needed discharge resources and transportation as appropriate   Identify discharge learning needs (meds, wound care, etc)   Arrange for interpreters to assist at discharge as needed     Problem: Safety - Adult  Goal: Free from fall injury  Outcome: Progressing  Flowsheets  Taken 7/8/2022 2228  Free From Fall Injury: Instruct family/caregiver on patient safety  Taken 7/8/2022 2227  Free From Fall Injury: Instruct family/caregiver on patient safety     Problem: Chronic Conditions and Co-morbidities  Goal: Patient's chronic conditions and co-morbidity symptoms are monitored and maintained or improved  Outcome: Progressing  Flowsheets (Taken 7/8/2022 2228)  Care Plan - Patient's Chronic Conditions and Co-Morbidity Symptoms are Monitored and Maintained or Improved:   Monitor and assess patient's chronic conditions and comorbid symptoms for stability, deterioration, or improvement   Collaborate with multidisciplinary team to address chronic and comorbid conditions and prevent exacerbation or deterioration     Problem: Pain  Goal: Verbalizes/displays adequate comfort level or baseline comfort level  Outcome: Progressing  Flowsheets (Taken 7/8/2022 2228)  Verbalizes/displays adequate comfort level or baseline comfort level:   Encourage patient to monitor pain and request assistance   Assess pain using appropriate pain scale   Administer analgesics based on type and severity of pain and evaluate response   Implement non-pharmacological measures as appropriate and evaluate response     Problem: ABCDS Injury Assessment  Goal: Absence of physical injury  Outcome: Progressing  Flowsheets (Taken 7/8/2022 2227)  Absence of Physical Injury: Implement safety measures based on patient assessment   Care plan reviewed with patient and family. Patient and family verbalize understanding of the plan of care and contribute to goal setting.

## 2022-07-09 NOTE — H&P
HISTORY AND PHYSICAL             Date: 7/9/2022        Patient Name: Mick Hylton     YOB: 1950      Age:  [address] [facility] y.o. Chief Complaint   No chief complaint on file. Worsening shortness of breath    History Obtained From   patient    History of Present Illness   The patient is a 58-year-old  female with an 18-year history of shortness of breath associated with prolonged intubation and laryngeal surgery. She is 3 days postop status post transoral laryngoplasty with left vocal fold lateralization and subtotal arytenoidectomy. Past Medical History     Past Medical History:   Diagnosis Date    CHF exacerbation ([address]     due to pneumonia infection    Hypertension     Pneumonia         Past Surgical History     Past Surgical History:   Procedure Laterality Date    LARYNGOSCOPY N/A 7/6/2022    Transoral Laryngoplasty Left Partial Arytenoidectomy, Left vocal cord lateralization and post cricoid flap performed by Sukhjinder Virgen MD at [place] [id number]        Medications Prior to Admission     Prior to Admission medications    Medication Sig Start Date End Date Taking? Authorizing Provider   Respiratory Therapy Supplies (NEBULIZER/TUBING/MOUTHPIECE) KIT 1 kit by Does not apply route three times daily 7/8/22   IRINEO Peralta CNP   sodium chloride nebulizer 0.9 % solution Take 3 mLs by nebulization in the morning, at noon, and at bedtime 7/8/22 8/7/22  IRINEO Peralta CNP   fluconazole (DIFLUCAN) 100 MG tablet TAKE 1 TABLET (100 MG) BY MOUTH DAILY FOR 3 DAYS.   Patient not taking: Reported on 7/6/2022 6/24/22   Historical Provider, MD   metoprolol tartrate (LOPRESSOR) 25 MG tablet Take 25 mg by mouth 2 times daily 3/16/22 7/25/22  Historical Provider, MD   lisinopril (PRINIVIL;ZESTRIL) 10 MG tablet TAKE 1 TABLET BY MOUTH EVERY DAY  Patient not taking: Reported on 7/6/2022 6/24/22   Historical Provider, MD   budesonide (PULMICORT) 0.5 MG/2ML nebulizer suspension Inhale 0.5 mg into the lungs 3/16/22 4/29/23  Historical Provider, MD   amoxicillin-clavulanate (AUGMENTIN) 875-125 MG per tablet Take 1 tablet by mouth 2 times daily  Patient not taking: Reported on 7/6/2022 10/25/21   Historical Provider, MD   escitalopram (LEXAPRO) 10 MG tablet Take 10 mg by mouth  Patient not taking: Reported on 7/6/2022 10/25/21 10/26/22  Historical Provider, MD        Allergies   Ciprofloxacin, Levaquin [levofloxacin], Morphine, and Statins    Social History     Social History     Tobacco History     Smoking Status  Never Smoker    Smokeless Tobacco Use  Never Used          Alcohol History     Alcohol Use Status  Not Currently          Drug Use     Drug Use Status  Never          Sexual Activity     Sexually Active  Not Asked                Family History   History reviewed. No pertinent family history. Review of Systems   Review of Systems    Physical Exam   BP (!) 153/64   Pulse 88   Temp 98.3 °F (36.8 °C) (Oral)   Resp 24   Ht 5' 1\" (1.549 m)   Wt 110 lb 3.7 oz (50 kg)   SpO2 98%   BMI 20.83 kg/m²     Physical Exam   On general physical exam the patient is a pleasant mildly anxious  female with inhalational stridor and a productive cough. Her oxygen saturations are adequate on supplemental oxygen. Postoperatively, she was borderline hypoxemic on room air. She came in without oxygen supplementation but her O2 sats and a blood gas were not preoperatively performed. Procedure: Flexible laryngoscopy under local anesthesia  Indication: The patient's stridor has worsened this morning compared to yesterday evening therefore she warrants an interval exam to decide whether or not she needs to go to the operating room for adjunctive surgery  Findings: The patient's hypopharynx and larynx were well visualized. Patient had fibrinous debris that was tenaciously attached to the supraglottis and glottic aperture likely obstructing her airway.   It was difficult to tell whether or not her lateralized cord was still in position because this material was covering her endolarynx along the posterior half of the left side. I attempted to suction it out but could not displace it. Laryngoscopy images:                            Labs      Recent Results (from the past 24 hour(s))   CBC with Auto Differential    Collection Time: 07/08/22  1:50 PM   Result Value Ref Range    WBC 6.9 4.8 - 10.8 thou/mm3    RBC 3.80 (L) 4.20 - 5.40 mill/mm3    Hemoglobin 11.9 (L) 12.0 - 16.0 gm/dl    Hematocrit 38.8 37.0 - 47.0 %    .1 (H) 81.0 - 99.0 fL    MCH 31.3 26.0 - 33.0 pg    MCHC 30.7 (L) 32.2 - 35.5 gm/dl    RDW-CV 13.8 11.5 - 14.5 %    RDW-SD 51.0 (H) 35.0 - 45.0 fL    Platelets 588 473 - 351 thou/mm3    MPV 10.2 9.4 - 12.4 fL    Seg Neutrophils 67.6 %    Lymphocytes 22.6 %    Monocytes 8.2 %    Eosinophils 0.9 %    Basophils 0.6 %    Immature Granulocytes 0.1 %    Segs Absolute 4.7 1.8 - 7.7 thou/mm3    Lymphocytes Absolute 1.6 1.0 - 4.8 thou/mm3    Monocytes Absolute 0.6 0.4 - 1.3 thou/mm3    Eosinophils Absolute 0.1 0.0 - 0.4 thou/mm3    Basophils Absolute 0.0 0.0 - 0.1 thou/mm3    Immature Grans (Abs) 0.01 0.00 - 0.07 thou/mm3    nRBC 0 /100 wbc        Imaging/Diagnostics Last 24 Hours   XR CHEST PORTABLE    Result Date: 7/9/2022  Chest X-ray: 1 view. Indication: Respiratory distress. Comparison: CORA SCHRADER - XR CHEST PORTABLE - 07/06/2022 04:02 PM EDT Findings: Removal of endotracheal tube since the prior study. Small bilateral pleural effusions, new since the prior study. Mild bibasilar atelectasis. The cardiac silhouette is mildly enlarged and stable in size. Aortic arch atherosclerotic calcifications. Bony thorax is grossly intact. Impression: Small bilateral pleural effusions. Mild bibasilar atelectasis. Findings new since the prior study.  This document has been electronically signed by: Isabel Clark MD on 07/09/2022 01:27 AM      [facility] Problems Last Modified POA    * (Principal) Status post surgery 7/6/2022 Yes    Stridor 7/6/2022 Yes      Worsening airway obstruction symptoms with visible fibrinous debris contributing to obstruction along with other possibilities including the on morning of the lateralized arytenoid. In addition the patient has bilateral pleural effusions and a productive cough. Plan   1. To the operating room for a suspension laryngoscopy and possible revision/stage II laryngoplasty to address additional aspects of her airway obstruction. This may include a supraglottoplasty. 2. To perform a bronchoalveolar lavage to get cultures of the sputum she is producing in the event that she has a resistant organism that has begun to produce respiratory symptoms despite being on Zosyn. I reviewed the indications benefits limitations and risks of proceeding in this manner with the patient and her boyfriend at the bedside to their satisfaction. The patient reports being at ease with my recommendations and wishing to proceed as recommended. The patient will be taken emergently to the operating room for this procedure.         Consultations Ordered:  IP CONSULT TO CRITICAL CARE  IP CONSULT TO SOCIAL WORK  IP CONSULT TO SPIRITUAL SERVICES    Electronically signed by Rachelle Perez MD on 7/9/22 at 9:29 AM EDT

## 2022-07-09 NOTE — ANESTHESIA POSTPROCEDURE EVALUATION
Department of Anesthesiology  Postprocedure Note    Patient: Kimberly Leos  MRN: [id number]  YOB: 1950  Date of evaluation: 7/9/2022      Procedure Summary     Date: 07/09/22 Room / Location: [address] SARINA Fong    Anesthesia Start: 2669 Anesthesia Stop: 6835    Procedure: SUSPENSION LARYNGOSCOPY, STAGE 2 LARYNGOPLASTY, EVACUATION HEMOTOMA, REMOVAL OBSTRUCTING FIBRINOUS DEBRIS, BRONCHEOAVELAR LAVAGE (N/A ) Diagnosis:       Stridor      (Stridor [R06.1])    Surgeons: Pamela Kan MD Responsible Provider: Roslyn Briscoe MD    Anesthesia Type: general ASA Status: 3          Anesthesia Type: No value filed.     Yoni Phase I: Yoni Score: 4    Yoni Phase II:        Anesthesia Post Evaluation    Patient location during evaluation: PACU  Patient participation: complete - patient participated  Airway patency: patent  Nausea & Vomiting: no vomiting and no nausea  Complications: no  Cardiovascular status: hemodynamically stable  Respiratory status: acceptable and nasal cannula  Hydration status: stable

## 2022-07-09 NOTE — PROGRESS NOTES
CRITICAL CARE NOTE        Patient:  Terie Landau    Unit/Bed:4B-02/002-A  MRN: [id number]   PCP: No primary care provider on file. Date of Admission: 7/6/2022    Assessment and Plan(All pulmonary edema, renal failure, PE, and respiratory failure diagnoses are acute in nature unless otherwise specified):            1. 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 On HFNC with FiO2 % with SpO2 goal >90%. Wean off as tolerated. On Zosyn postsurgical, prophylactic. S/p trach removal 7/7. Plan for revision and possible stage II larygoplasty per Dr. Tamie Powell today. 2. Acute Stridor/Chronic Tracheal stenosis: 7/6/2022 status post transoral laryngoplasty left parietal arytenoidectomy left vocal cord lateralization. Jet ventilation catheter remains in place remove 7/7d. Monitor. 3. Anemia, normocytic: Hb stable at 10-11, 13.5 on admission 7/5. Likely due to acute blood loss from surgery. Currently with no overt signs of bleed. Monitor. 4. Chronic diastolic HF: TTE 2/4789 LVEF >70% mild left ventricular outflow tract obstruction, RV Systolic Pressure 47, mild to mod tricuspid regurgitation. No evidence of decompensation. Monitor. 5. Primary HTN: controlled. Home Lopressor resumed. Not taking Lisinopril as listed in home meds. CC: stridor  HPI: 73 yo F with history of chronic hoarseness, bilateral vocal fold fixation s/p intubation 2004 s/p partial cordectomy with laser, temporary tracheostomy 2009 s/p decannulation, with stridor and known tracheal stenosis. Presented for planned firberoptic exam with Dr. Tamie Powell. CT of Neck 7/5/2022 left vocal cord paralysis without definite causative lesion.  7/6/2022 patient did undergo transoral laryngoplasty left partial arytenoidectomy. Had prolonged recovery from anesthesia, unresponsive for ~1 hr. Tansitioned to 6L NC. Jet vent removed 7/7. ROS: reports no new symptoms including dyspnea, chest pain, dizziness/lightheadedness, nausea      Subjective (events over the past 24 hours):   No acute events overnight. Remained hemodynamically stable. POD#3. Patient reports breathing is better but still having a stridor. Per Dr. Mckee Drivers, bedside laryngoscopy done. Noted to have a ?mucus plug. Will ve t    Objective:  Vitals:  Temp: 98.2 °F (36.8 °C)  Heart Rate: 96  Resp: 24  BP: (!) 158/58  FiO2 : 40 %  SpO2: 95 %  [place] Coma Scale  Eye Opening: Spontaneous  Best Verbal Response: Oriented  Best Motor Response: Obeys commands  Rams[place] Coma Scale Score: 15    MAP: 93    Ventilator:  Vent Information  Vent Mode: PCV+  Resp Rate (Set): 10 bmp  FiO2 : 40 %  PEEP/CPAP (cmH2O): 6  Pressure Ordered: 20 (P control 14)     Vent Patient Data  Rate Measured: 10 br/min  Vt Exhaled: 296 mL  Minute Volume: 3 Liters      Input/Output: In: 1858.3   Out: 3750 [Urine:3750]    Intake/Output Summary (Last 24 hours) at 7/9/2022 0621  Last data filed at 7/9/2022 0238  Gross per 24 hour   Intake 1653.06 ml   Output 3150 ml   Net -1496.94 ml   UOP: 0.7 cc/kg/hr  Net UOP since admission: +235   Last Bowel Movement:     [place]: No  Drains: No  Drips: No  Central Lines/Ports: No  Art Line:No   PICC Line: No   PIV left proximal anterior forearm 7/6  ETT: No    Medications:     sodium chloride        psyllium  1 packet Oral Daily    escitalopram  10 mg Oral Nightly    lisinopril  10 mg Oral Daily    metoprolol tartrate  25 mg Oral BID    piperacillin-tazobactam  3,375 mg IntraVENous Q8H    sodium chloride flush  5-40 mL IntraVENous 2 times per day    enoxaparin  30 mg SubCUTAneous Q24H         BMI:  Body mass index is 20.83 kg/m².      Physical Exam:  General:  Tired appearing but no significant respiratory distress- looks better today  HEENT:

## 2022-07-09 NOTE — PROGRESS NOTES
1234 pt arrived to PACU, chin lift assist provided on arrival. O2 intially 60% on 4L, increased to 92% with chin lift assist and increase of O2 to 6L NC.   1240 pt awake and moving in bed. Denies pain at this time. VSS  1247 Xray at bedside. Placed on 60L HFNC  1257 pt awake in bed, denies pain.  VSS  1310 meets criteria for discharge from PACU, transported to [facility] by this RN

## 2022-07-09 NOTE — PROGRESS NOTES
Heated High Flow machine successfully transported from PACU. Pt placed back on cannula. 20L/40% for humidification per Katie Munoz.  Pt 100% SAT at this time

## 2022-07-10 LAB
ANION GAP SERPL CALCULATED.3IONS-SCNC: 10 MEQ/L (ref 8–16)
BACTERIA: ABNORMAL
BILIRUBIN URINE: NEGATIVE
BLOOD, URINE: NEGATIVE
BUN BLDV-MCNC: 14 MG/DL (ref 7–22)
CALCIUM SERPL-MCNC: 8.8 MG/DL (ref 8.5–10.5)
CASTS: ABNORMAL /LPF
CASTS: ABNORMAL /LPF
CHARACTER, URINE: CLEAR
CHLORIDE BLD-SCNC: 99 MEQ/L (ref 98–111)
CO2: 33 MEQ/L (ref 23–33)
COLOR: YELLOW
CREAT SERPL-MCNC: 0.5 MG/DL (ref 0.4–1.2)
CRYSTALS: ABNORMAL
EKG ATRIAL RATE: 66 BPM
EKG P AXIS: 30 DEGREES
EKG P-R INTERVAL: 100 MS
EKG Q-T INTERVAL: 402 MS
EKG QRS DURATION: 84 MS
EKG QTC CALCULATION (BAZETT): 421 MS
EKG R AXIS: 10 DEGREES
EKG T AXIS: 111 DEGREES
EKG VENTRICULAR RATE: 66 BPM
EPITHELIAL CELLS, UA: ABNORMAL /HPF
GFR SERPL CREATININE-BSD FRML MDRD: > 90 ML/MIN/1.73M2
GLUCOSE BLD-MCNC: 94 MG/DL (ref 70–108)
GLUCOSE, URINE: NEGATIVE MG/DL
KETONES, URINE: 15
LEUKOCYTE EST, POC: ABNORMAL
MISCELLANEOUS LAB TEST RESULT: ABNORMAL
NITRITE, URINE: NEGATIVE
PH UA: 6.5 (ref 5–9)
POTASSIUM SERPL-SCNC: 3.8 MEQ/L (ref 3.5–5.2)
PROTEIN UA: 30 MG/DL
RBC URINE: ABNORMAL /HPF
RENAL EPITHELIAL, UA: ABNORMAL
SODIUM BLD-SCNC: 142 MEQ/L (ref 135–145)
SPECIFIC GRAVITY UA: 1.02 (ref 1–1.03)
UROBILINOGEN, URINE: 1 EU/DL (ref 0–1)
WBC UA: ABNORMAL /HPF
YEAST: ABNORMAL

## 2022-07-10 PROCEDURE — 80048 BASIC METABOLIC PNL TOTAL CA: CPT

## 2022-07-10 PROCEDURE — 2580000003 HC RX 258: Performed by: NURSE PRACTITIONER

## 2022-07-10 PROCEDURE — 6370000000 HC RX 637 (ALT 250 FOR IP): Performed by: NURSE PRACTITIONER

## 2022-07-10 PROCEDURE — 81001 URINALYSIS AUTO W/SCOPE: CPT

## 2022-07-10 PROCEDURE — 94640 AIRWAY INHALATION TREATMENT: CPT

## 2022-07-10 PROCEDURE — 2580000003 HC RX 258: Performed by: PHYSICIAN ASSISTANT

## 2022-07-10 PROCEDURE — 93010 ELECTROCARDIOGRAM REPORT: CPT | Performed by: INTERNAL MEDICINE

## 2022-07-10 PROCEDURE — 6360000002 HC RX W HCPCS: Performed by: PHYSICIAN ASSISTANT

## 2022-07-10 PROCEDURE — 99291 CRITICAL CARE FIRST HOUR: CPT | Performed by: INTERNAL MEDICINE

## 2022-07-10 PROCEDURE — 2700000000 HC OXYGEN THERAPY PER DAY

## 2022-07-10 PROCEDURE — 36415 COLL VENOUS BLD VENIPUNCTURE: CPT

## 2022-07-10 PROCEDURE — 2060000000 HC ICU INTERMEDIATE R&B

## 2022-07-10 PROCEDURE — 6360000002 HC RX W HCPCS: Performed by: NURSE PRACTITIONER

## 2022-07-10 PROCEDURE — 94760 N-INVAS EAR/PLS OXIMETRY 1: CPT

## 2022-07-10 PROCEDURE — 6360000002 HC RX W HCPCS: Performed by: OTOLARYNGOLOGY

## 2022-07-10 PROCEDURE — 93005 ELECTROCARDIOGRAM TRACING: CPT | Performed by: NURSE PRACTITIONER

## 2022-07-10 RX ORDER — SODIUM CHLORIDE FOR INHALATION 0.9 %
3 VIAL, NEBULIZER (ML) INHALATION 3 TIMES DAILY
Status: DISCONTINUED | OUTPATIENT
Start: 2022-07-10 | End: 2022-07-12 | Stop reason: HOSPADM

## 2022-07-10 RX ORDER — SODIUM CHLORIDE FOR INHALATION 0.9 %
3 VIAL, NEBULIZER (ML) INHALATION 3 TIMES DAILY
Qty: 270 ML | Refills: 2 | Status: SHIPPED | OUTPATIENT
Start: 2022-07-10 | End: 2022-08-09

## 2022-07-10 RX ORDER — NEBULIZER ACCESSORIES
1 KIT MISCELLANEOUS 3 TIMES DAILY
Qty: 1 KIT | Refills: 0 | Status: SHIPPED | OUTPATIENT
Start: 2022-07-10

## 2022-07-10 RX ADMIN — PSYLLIUM HUSK 1 PACKET: 3.4 GRANULE ORAL at 07:58

## 2022-07-10 RX ADMIN — PIPERACILLIN AND TAZOBACTAM 3375 MG: 3; .375 INJECTION, POWDER, LYOPHILIZED, FOR SOLUTION INTRAVENOUS at 21:49

## 2022-07-10 RX ADMIN — KETOROLAC TROMETHAMINE 15 MG: 30 INJECTION, SOLUTION INTRAMUSCULAR; INTRAVENOUS at 00:18

## 2022-07-10 RX ADMIN — METOPROLOL TARTRATE 25 MG: 25 TABLET, FILM COATED ORAL at 22:36

## 2022-07-10 RX ADMIN — LISINOPRIL 10 MG: 10 TABLET ORAL at 07:56

## 2022-07-10 RX ADMIN — ISODIUM CHLORIDE 3 ML: 0.03 SOLUTION RESPIRATORY (INHALATION) at 12:17

## 2022-07-10 RX ADMIN — ISODIUM CHLORIDE 3 ML: 0.03 SOLUTION RESPIRATORY (INHALATION) at 20:36

## 2022-07-10 RX ADMIN — ONDANSETRON 4 MG: 2 INJECTION INTRAMUSCULAR; INTRAVENOUS at 23:37

## 2022-07-10 RX ADMIN — SODIUM CHLORIDE, PRESERVATIVE FREE 10 ML: 5 INJECTION INTRAVENOUS at 07:57

## 2022-07-10 RX ADMIN — PIPERACILLIN AND TAZOBACTAM 3375 MG: 3; .375 INJECTION, POWDER, LYOPHILIZED, FOR SOLUTION INTRAVENOUS at 06:32

## 2022-07-10 RX ADMIN — METOPROLOL TARTRATE 25 MG: 25 TABLET, FILM COATED ORAL at 07:56

## 2022-07-10 RX ADMIN — ESCITALOPRAM OXALATE 10 MG: 10 TABLET ORAL at 22:36

## 2022-07-10 RX ADMIN — SODIUM CHLORIDE, PRESERVATIVE FREE 10 ML: 5 INJECTION INTRAVENOUS at 23:01

## 2022-07-10 RX ADMIN — SODIUM CHLORIDE 10 ML/HR: 9 INJECTION, SOLUTION INTRAVENOUS at 21:48

## 2022-07-10 RX ADMIN — SODIUM CHLORIDE, PRESERVATIVE FREE 10 ML: 5 INJECTION INTRAVENOUS at 23:37

## 2022-07-10 ASSESSMENT — PAIN SCALES - GENERAL
PAINLEVEL_OUTOF10: 0
PAINLEVEL_OUTOF10: 4
PAINLEVEL_OUTOF10: 0
PAINLEVEL_OUTOF10: 0
PAINLEVEL_OUTOF10: 4

## 2022-07-10 ASSESSMENT — ENCOUNTER SYMPTOMS
BACK PAIN: 0
NAUSEA: 0
COLOR CHANGE: 0
TROUBLE SWALLOWING: 0
CONSTIPATION: 1
WHEEZING: 0
VOMITING: 0
SORE THROAT: 1
SHORTNESS OF BREATH: 0
CHEST TIGHTNESS: 0
PHOTOPHOBIA: 0

## 2022-07-10 ASSESSMENT — PAIN DESCRIPTION - ORIENTATION
ORIENTATION: ANTERIOR
ORIENTATION: ANTERIOR

## 2022-07-10 ASSESSMENT — PAIN SCALES - WONG BAKER
WONGBAKER_NUMERICALRESPONSE: 0
WONGBAKER_NUMERICALRESPONSE: 0

## 2022-07-10 ASSESSMENT — PAIN DESCRIPTION - ONSET
ONSET: GRADUAL
ONSET: GRADUAL

## 2022-07-10 ASSESSMENT — PAIN DESCRIPTION - DESCRIPTORS
DESCRIPTORS: DULL
DESCRIPTORS: DULL

## 2022-07-10 NOTE — PROGRESS NOTES
Physician Progress Note      PATIENT:               Exie Mcburney  CSN #:                  [id number]  :                       1950  ADMIT DATE:       2022 10:36 AM  DISCH DATE:  RESPONDING  PROVIDER #:        KAJAL Yeager CNP          QUERY TEXT:    Pt admitted with laryngeal stenosis and noted documentation of acute hypoxic   respiratory failure post-operatively. ? Please document in progress notes and   discharge summary if you are evaluating/treating any of the following: The medical record reflects the following:  Risk Factors: surgery  Clinical Indicators: prolonged emergence from sedation per critical care   consult note; placed on high flow oxygen with titration as able post-op; acute   hypoxic respiratory failure documented in progress notes post-op with no   hypoxia noted on flowsheet documentation; lung sounds with expiratory wheezes,   no work of breathing or tachypnea  Treatment: High flow oxygen, ICU monitoring  Options provided:  -- Acute? Postoperative Pulmonary Insufficiency, Postoperative Respiratory   failure is ruled?out  -- Respiratory failure?is not a complication of the procedure but was due to,   Please specify. -- Postoperative Respiratory failure is a complication of the procedure  -- Mechanical ventilation support after procedure without respiratory failure  -- Other - I will add my own diagnosis  -- Disagree - Not applicable / Not valid  -- Disagree - Clinically unable to determine / Unknown  -- Refer to Clinical Documentation Reviewer    PROVIDER RESPONSE TEXT:    Patient has acute postoperative pulmonary insufficiency, Postoperative   Respiratory failure is ruled out.     Query created by: Anna Wang on 2022 7:20 AM      Electronically signed by:  Fer Yeager CNP 7/10/2022 3:34 PM

## 2022-07-10 NOTE — PROCEDURES
EKG performed at 0150.   This technician spoke with Lea GIL about the ST elevation in leads V1, V2, and V3.

## 2022-07-10 NOTE — PLAN OF CARE
Problem: Discharge Planning  Goal: Discharge to home or other facility with appropriate resources  7/10/2022 1857 by Manuel Jacobo RN  Outcome: Progressing  Flowsheets (Taken 7/10/2022 1857)  Discharge to home or other facility with appropriate resources:   Identify barriers to discharge with patient and caregiver   Arrange for needed discharge resources and transportation as appropriate   Identify discharge learning needs (meds, wound care, etc)   Arrange for interpreters to assist at discharge as needed  Note: Progressing towards d/C.  7/10/2022 1133 by Leona Douglass RN  Outcome: Progressing  Note: Communication with treatment team and patient about discharge plan and appropriate resources. Problem: Safety - Adult  Goal: Free from fall injury  7/10/2022 1857 by Manuel Jacobo RN  Outcome: Progressing  Flowsheets (Taken 7/10/2022 1857)  Free From Fall Injury:   Instruct family/caregiver on patient safety   Based on caregiver fall risk screen, instruct family/caregiver to ask for assistance with transferring infant if caregiver noted to have fall risk factors  Note: Free from injury. 7/10/2022 1133 by Leona Douglass RN  Outcome: Progressing  Note: The patient has been assessed for falls and the room room has been assessed for hazards to safety. Hand hygeine has been performed upon additional hazards to their safety. Will continue to monitor and assess throughout my hourly rounding.        Problem: Chronic Conditions and Co-morbidities  Goal: Patient's chronic conditions and co-morbidity symptoms are monitored and maintained or improved  Outcome: Progressing  Flowsheets (Taken 7/10/2022 1857)  Care Plan - Patient's Chronic Conditions and Co-Morbidity Symptoms are Monitored and Maintained or Improved:   Monitor and assess patient's chronic conditions and comorbid symptoms for stability, deterioration, or improvement   Collaborate with multidisciplinary team to address chronic and comorbid conditions and prevent exacerbation or deterioration   Update acute care plan with appropriate goals if chronic or comorbid symptoms are exacerbated and prevent overall improvement and discharge  Note: Progress towards D/C. Problem: Pain  Goal: Verbalizes/displays adequate comfort level or baseline comfort level  7/10/2022 1857 by Baljit Sorto RN  Outcome: Progressing  Flowsheets (Taken 7/10/2022 1857)  Verbalizes/displays adequate comfort level or baseline comfort level:   Encourage patient to monitor pain and request assistance   Assess pain using appropriate pain scale   Administer analgesics based on type and severity of pain and evaluate response   Implement non-pharmacological measures as appropriate and evaluate response   Consider cultural and social influences on pain and pain management  Note: As above. 7/10/2022 1133 by Dasia Hoffmann RN  Outcome: Progressing  Note: Patient displays improved well-being such as baseline levels for pulse, BP, respirations and relaxed muscle tone or body posture. Monitor using 0-10 pain scale. Problem: Respiratory - Adult  Goal: Ability to maintain adequate oxygenation will improve  Description: Ability to maintain adequate oxygenation will improve  7/10/2022 1220 by Katharine Mcgee RCP  Outcome: Progressing  Note: Sodium Chloride to improve cough effectiveness. Problem: ABCDS Injury Assessment  Goal: Absence of physical injury  Outcome: Progressing  Flowsheets (Taken 7/10/2022 1857)  Absence of Physical Injury: Implement safety measures based on patient assessment  Note: Progress towards D/C. Problem: Anxiety  Goal: Will report anxiety at manageable levels  Description: INTERVENTIONS:  1. Administer medication as ordered  2. Teach and rehearse alternative coping skills  3.  Provide emotional support with 1:1 interaction with staff  Outcome: Progressing  Flowsheets (Taken 7/10/2022 1857)  Will report anxiety at manageable levels:   Administer medication as ordered   Provide emotional support with 1:1 interaction with staff  Note: Progress towards discharge. Problem: Spiritual Care  Goal: Pt/Family able to move forward in process of forgiving self, others, and/or higher power  Description: INTERVENTIONS:  1. Assist patient/family to overcome blocks to healing by use of spiritual practices (prayer, meditation, guided imagery, reiki, breath work, etc). 2. De-myth guilt and help patient/family identify possible irrational spiritual/cultural beliefs and values. 3. Explore possibilities of making amends & reconciliation with self, others, and/or a greater power. 4. Guide patient/family in identifying painful feelings of guilt. 5. Help patient/famiy explore and identify spiritual beliefs, cultural understandings or values that may help or hinder letting go of issue. 6. Help patient/family explore feelings of anger, bitterness, resentment. 7. Help patient/family identify and examine the situation in which these feelings are experienced. 8. Help patient/family identify destructive displacement of feelings onto other individuals. 9. Invite use of sacraments/rituals/ceremonies as appropriate (e.g. - confession, anointing, smudging). 10. Refer patient/family to formal counseling and/or to [facility] community for further support work.   Outcome: Progressing  Flowsheets (Taken 7/10/2022 1857)  Patient/family able to move forward in process of forgiving self, others, and/or higher power:   Assist patient to overcome blocks to healing by use of spiritual practices (prayer, meditation, guided imagery, reiki, breath work, etc)   De-myth guilt and help patient/family identify possible irrational spiritual/cultural beliefs and values   Explore possibilities of making amends and reconciliation with self, others, and/or a greater power   Guide patient/family in identifying painful feelings of guilt   Help patient explore and identify spiritual beliefs, cultural understandings or values that may help or hinder letting go of issue   Help patient explore feelings of anger, bitterness, resentment   Help patient/family identify and examine the situation in which these feelings are experienced   Help patient/family identify destructive displacement of feelings onto other individuals   Invite use of sacraments/rituals/ceremonies as appropriate (e.g. - confession, anointing, smudging)  Note: Progress towards discharge.

## 2022-07-10 NOTE — FLOWSHEET NOTE
07/10/22 6730   Safe Environment   Safety Measures Other (comment)  (Virtual safety round complete.)   Pt lying in bed, bedside RN and family in room, call light is within reach, will continue to monitor.

## 2022-07-10 NOTE — PLAN OF CARE
Problem: Discharge Planning  Goal: Discharge to home or other facility with appropriate resources  Outcome: Progressing  Flowsheets (Taken 7/9/2022 2233)  Discharge to home or other facility with appropriate resources:   Identify barriers to discharge with patient and caregiver   Arrange for needed discharge resources and transportation as appropriate   Identify discharge learning needs (meds, wound care, etc)   Refer to discharge planning if patient needs post-hospital services based on physician order or complex needs related to functional status, cognitive ability or social support system  Note: Patient from [address] [place] [address], just had another surgery today. Social work and case management on board. Problem: Safety - Adult  Goal: Free from fall injury  Outcome: Progressing  Flowsheets  Taken 7/9/2022 2233  Free From Fall Injury: Instruct family/caregiver on patient safety  Taken 7/9/2022 2232  Free From Fall Injury: Instruct family/caregiver on patient safety  Note: Patient in bed, call light and items in reach, non skid socks on, side rail up x 2, bed alarm on. Hourly rounding in place.        Problem: Chronic Conditions and Co-morbidities  Goal: Patient's chronic conditions and co-morbidity symptoms are monitored and maintained or improved  Outcome: Progressing  Flowsheets (Taken 7/9/2022 2233)  Care Plan - Patient's Chronic Conditions and Co-Morbidity Symptoms are Monitored and Maintained or Improved:   Monitor and assess patient's chronic conditions and comorbid symptoms for stability, deterioration, or improvement   Collaborate with multidisciplinary team to address chronic and comorbid conditions and prevent exacerbation or deterioration   Update acute care plan with appropriate goals if chronic or comorbid symptoms are exacerbated and prevent overall improvement and discharge     Problem: Pain  Goal: Verbalizes/displays adequate comfort level or baseline comfort level  Outcome: Progressing  Flowsheets (Taken 7/9/2022 2233)  Verbalizes/displays adequate comfort level or baseline comfort level:   Encourage patient to monitor pain and request assistance   Assess pain using appropriate pain scale   Administer analgesics based on type and severity of pain and evaluate response   Implement non-pharmacological measures as appropriate and evaluate response   Consider cultural and social influences on pain and pain management   Notify Licensed Independent Practitioner if interventions unsuccessful or patient reports new pain  Note: No complaints of pain at this time. Patient states does not tolerate pain meds well, intermittent ice      Problem: ABCDS Injury Assessment  Goal: Absence of physical injury  Outcome: Progressing  Flowsheets  Taken 7/9/2022 2234  Absence of Physical Injury: Implement safety measures based on patient assessment  Taken 7/9/2022 2232  Absence of Physical Injury: Implement safety measures based on patient assessment   Care plan reviewed with patient and family. Patient and family verbalize understanding of the plan of care and contribute to goal setting.

## 2022-07-10 NOTE — PLAN OF CARE
Problem: Discharge Planning  Goal: Discharge to home or other facility with appropriate resources  7/10/2022 1133 by Mary Harris RN  Outcome: Progressing  Note: Communication with treatment team and patient about discharge plan and appropriate resources. Problem: Safety - Adult  Goal: Free from fall injury  7/10/2022 1133 by Mary Harris RN  Outcome: Progressing  Note: The patient has been assessed for falls and the room room has been assessed for hazards to safety. Hand hygeine has been performed upon additional hazards to their safety. Will continue to monitor and assess throughout my hourly rounding. Problem: Pain  Goal: Verbalizes/displays adequate comfort level or baseline comfort level  7/10/2022 1133 by Mary Harris RN  Outcome: Progressing  Note: Patient displays improved well-being such as baseline levels for pulse, BP, respirations and relaxed muscle tone or body posture. Monitor using 0-10 pain scale.       Problem: Chronic Conditions and Co-morbidities  Goal: Patient's chronic conditions and co-morbidity symptoms are monitored and maintained or improved  7/10/2022 1133 by Mary Harris RN  Outcome: Progressing  Care Plan - Patient's Chronic Conditions and Co-Morbidity Symptoms are Monitored and Maintained or Improved:   Monitor and assess patient's chronic conditions and comorbid symptoms for stability, deterioration, or improvement   Collaborate with multidisciplinary team to address chronic and comorbid conditions and prevent exacerbation or deterioration   Update acute care plan with appropriate goals if chronic or comorbid symptoms are exacerbated and prevent overall improvement and discharge

## 2022-07-10 NOTE — PLAN OF CARE
Problem: Respiratory - Adult  Goal: Ability to maintain adequate oxygenation will improve  Description: Ability to maintain adequate oxygenation will improve  Outcome: Progressing  Note: Sodium Chloride to improve cough effectiveness.

## 2022-07-10 NOTE — PROGRESS NOTES
CRITICAL CARE PROGRESS NOTE      Patient:  Terie Landau    Unit/Bed:4B-02/002-A  YOB: 1950  MRN: [id number]   PCP: No primary care provider on file. Date of Admission: 7/6/2022  Chief Complaint:- Acute postoperative pulmonary insufficiency    Assessment and Plan:      1. Acute postoperative pulmonary insufficiency: Patient requiring high flow oxygen. Wean as tolerated per ENT. 2. Chronic tracheal stenosis: Status post suspended laryngoscopy stage II laryngeal plasty with evacuation of hematoma. Patient doing well postoperatively. Dr. Kristal Crews Following. ENT Managing Airway. Antibiotics per ENTs discretion. Resuming Lovenox at ENTs discretion. 3. Acute macrocytic anemia: Hemoglobin 11.9, hematocrit 38.8, stable. No active signs of bleeding. Patient did return to the OR for hematoma. Stable. Trend CBC daily. 4. Hypertension: Continue home medications. Lisinopril and Lopressor. 5. Chronic diastolic heart failure: Echocardiogram 4/2022 revealed ejection fraction greater than 70% with mild left ventricular outflow tract obstruction. Minutes of decompensation. 6. Anxiety/depression: Lexapro continues. INITIAL H AND P AND ICU COURSE:    Fernanda Bazzi 77-year-old white female who presented to [facility] on 7/6/2022 for planned surgery with Dr. Kristal Crews for stridor and known tracheal stenosis. She has a past medical history of lifetime non-smoker, CHF, hypertension, pneumonia. 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 She had tracheostomy tube placement in 2009. She has stridor with known tracheal stenosis. She underwent exam with Dr. Kristal Crews which did show left vocal cord paralysis without causative lesion. On 7/6/2022 she underwent transoral laryngeal plasty left partial arytenoidectomy. Postoperatively she was transferred to the ICU.   On 7/9 patient developed worsening stridor. She underwent revision surgery with Dr. Jyoti Arroyo. She was planned to have a second part surgery although it was not planned to be so quickly. It was noted to have a hematoma. Additional surgical intervention was completed with jet ventilation. Postoperatively patient again returned to the ICU. On 7/10 patient stable. She remains on high flow oxygen. She has no stridor at this time. She was examined by ENT who recommended transfer to stepdown. Past Medical History: Per HPI  Family History: No known family history  Social History: Lifetime non-smoker, denies alcohol use, denies drug use. Review of Systems   Constitutional: Negative for fatigue and fever. HENT: Positive for sore throat. Negative for trouble swallowing. Eyes: Negative for photophobia and visual disturbance. Respiratory: Negative for chest tightness, shortness of breath and wheezing. Cardiovascular: Negative for chest pain and leg swelling. Gastrointestinal: Positive for constipation. Negative for nausea and vomiting. Endocrine: Negative for polydipsia and polyphagia. Genitourinary: Negative for decreased urine volume and dysuria. Musculoskeletal: Positive for neck pain. Negative for back pain. Skin: Negative for color change, pallor and rash. Allergic/Immunologic: Negative for food allergies. Neurological: Negative for dizziness, weakness and numbness. Hematological: Negative for adenopathy. Psychiatric/Behavioral: Negative for agitation and confusion. The patient is not hyperactive.       Scheduled Meds:   sodium chloride nebulizer  3 mL Nebulization TID    psyllium  1 packet Oral Daily    escitalopram  10 mg Oral Nightly    lisinopril  10 mg Oral Daily    metoprolol tartrate  25 mg Oral BID    piperacillin-tazobactam  3,375 mg IntraVENous Q8H    sodium chloride flush  5-40 mL IntraVENous 2 times per day    [Held by provider] enoxaparin  30 mg SubCUTAneous Q24H     Continuous Infusions:   sodium chloride         PHYSICAL EXAMINATION:  T:  99.5. P:  73. RR:  23. B/P:  149/53. FiO2:  40. O2 Sat:  99.  I/O: 700/710  Body mass index is 20.83 kg/m². GCS: 15  General: Acute on chronically ill-appearing white female  HEENT:  normocephalic and atraumatic. No scleral icterus. PERR  Neck: supple. No Thyromegaly. Remove tracheostomy site  Lungs: clear to auscultation. No retractions  Cardiac: RRR. No JVD. Abdomen: soft. Nontender. Extremities:  No clubbing, cyanosis, or edema x 4. Vasculature: capillary refill < 3 seconds. Palpable dorsalis pedis pulses. Skin:  warm and dry. Psych:  Alert and oriented x3. Affect appropriate  Lymph:  No supraclavicular adenopathy. Neurologic:  No focal deficit. No seizures. Data: (All radiographs, tracings, PFTs, and imaging are personally viewed and interpreted unless otherwise noted).  Sodium 142, potassium 3.8, chloride 99, CO2 33, BUN 14, creatinine 0.5, anion gap 10.0, glucose 94.  WBC 6.9, hemoglobin 11.9, hematocrit 38.8, platelet count 022   Telemetry shows normal sinus rhythm   CT soft tissue neck 7/5/2022 reports left vocal cord paresis without definite causative lesion   Chest x-ray 7/6/2022 reports no pneumothorax, endotracheal tube in place.  Chest x-ray 7/8/2022 reports small bilateral pleural effusions, bibasilar atelectasis   Chest x-ray 7/9/2022 reports increased density over the right lung base. No pneumothorax.  EKG 7/10/2022 reports normal sinus rhythm      Meets Continued ICU Level Care Criteria:    [] Yes   [x] No - Transfer Planned to listed location: 4K-27  [x] HOSPITALIST CONTACTED- DR Harrison (resident)     Case and plan discussed with Dr. Padma Toure and Dr. Jeovanny Vizcarra        Electronically signed by Marino Winslow.  IRINEO Cortés - CNP  CRITICAL CARE SPECIALIST

## 2022-07-11 LAB
AEROBIC CULTURE: NORMAL
ANION GAP SERPL CALCULATED.3IONS-SCNC: 6 MEQ/L (ref 8–16)
BASOPHILS # BLD: 0.5 %
BASOPHILS ABSOLUTE: 0 THOU/MM3 (ref 0–0.1)
BUN BLDV-MCNC: 15 MG/DL (ref 7–22)
CALCIUM SERPL-MCNC: 9.1 MG/DL (ref 8.5–10.5)
CHLORIDE BLD-SCNC: 102 MEQ/L (ref 98–111)
CO2: 36 MEQ/L (ref 23–33)
CREAT SERPL-MCNC: 0.4 MG/DL (ref 0.4–1.2)
EOSINOPHIL # BLD: 0.9 %
EOSINOPHILS ABSOLUTE: 0.1 THOU/MM3 (ref 0–0.4)
ERYTHROCYTE [DISTWIDTH] IN BLOOD BY AUTOMATED COUNT: 12.9 % (ref 11.5–14.5)
ERYTHROCYTE [DISTWIDTH] IN BLOOD BY AUTOMATED COUNT: 47.8 FL (ref 35–45)
GFR SERPL CREATININE-BSD FRML MDRD: > 90 ML/MIN/1.73M2
GLUCOSE BLD-MCNC: 118 MG/DL (ref 70–108)
GLUCOSE BLD-MCNC: 123 MG/DL (ref 70–108)
GRAM STAIN RESULT: NORMAL
HCT VFR BLD CALC: 35.9 % (ref 37–47)
HEMOGLOBIN: 11.4 GM/DL (ref 12–16)
IMMATURE GRANS (ABS): 0.02 THOU/MM3 (ref 0–0.07)
IMMATURE GRANULOCYTES: 0.3 %
LYMPHOCYTES # BLD: 25.1 %
LYMPHOCYTES ABSOLUTE: 1.6 THOU/MM3 (ref 1–4.8)
MCH RBC QN AUTO: 31.8 PG (ref 26–33)
MCHC RBC AUTO-ENTMCNC: 31.8 GM/DL (ref 32.2–35.5)
MCV RBC AUTO: 100 FL (ref 81–99)
MONOCYTES # BLD: 9.6 %
MONOCYTES ABSOLUTE: 0.6 THOU/MM3 (ref 0.4–1.3)
NUCLEATED RED BLOOD CELLS: 0 /100 WBC
PLATELET # BLD: 198 THOU/MM3 (ref 130–400)
PMV BLD AUTO: 9.6 FL (ref 9.4–12.4)
POTASSIUM SERPL-SCNC: 3.8 MEQ/L (ref 3.5–5.2)
RBC # BLD: 3.59 MILL/MM3 (ref 4.2–5.4)
SEG NEUTROPHILS: 63.6 %
SEGMENTED NEUTROPHILS ABSOLUTE COUNT: 4 THOU/MM3 (ref 1.8–7.7)
SODIUM BLD-SCNC: 144 MEQ/L (ref 135–145)
WBC # BLD: 6.3 THOU/MM3 (ref 4.8–10.8)

## 2022-07-11 PROCEDURE — 85025 COMPLETE CBC W/AUTO DIFF WBC: CPT

## 2022-07-11 PROCEDURE — 6370000000 HC RX 637 (ALT 250 FOR IP): Performed by: NURSE PRACTITIONER

## 2022-07-11 PROCEDURE — 36415 COLL VENOUS BLD VENIPUNCTURE: CPT

## 2022-07-11 PROCEDURE — 2580000003 HC RX 258: Performed by: NURSE PRACTITIONER

## 2022-07-11 PROCEDURE — 97535 SELF CARE MNGMENT TRAINING: CPT

## 2022-07-11 PROCEDURE — 99233 SBSQ HOSP IP/OBS HIGH 50: CPT | Performed by: INTERNAL MEDICINE

## 2022-07-11 PROCEDURE — 94760 N-INVAS EAR/PLS OXIMETRY 1: CPT

## 2022-07-11 PROCEDURE — 2700000000 HC OXYGEN THERAPY PER DAY

## 2022-07-11 PROCEDURE — 97166 OT EVAL MOD COMPLEX 45 MIN: CPT

## 2022-07-11 PROCEDURE — 6370000000 HC RX 637 (ALT 250 FOR IP): Performed by: INTERNAL MEDICINE

## 2022-07-11 PROCEDURE — 94669 MECHANICAL CHEST WALL OSCILL: CPT

## 2022-07-11 PROCEDURE — 2060000000 HC ICU INTERMEDIATE R&B

## 2022-07-11 PROCEDURE — 6360000002 HC RX W HCPCS

## 2022-07-11 PROCEDURE — 80048 BASIC METABOLIC PNL TOTAL CA: CPT

## 2022-07-11 PROCEDURE — 94640 AIRWAY INHALATION TREATMENT: CPT

## 2022-07-11 PROCEDURE — 6360000002 HC RX W HCPCS: Performed by: INTERNAL MEDICINE

## 2022-07-11 PROCEDURE — 2580000003 HC RX 258: Performed by: PHYSICIAN ASSISTANT

## 2022-07-11 PROCEDURE — 6360000002 HC RX W HCPCS: Performed by: PHYSICIAN ASSISTANT

## 2022-07-11 PROCEDURE — 82948 REAGENT STRIP/BLOOD GLUCOSE: CPT

## 2022-07-11 RX ORDER — PROMETHAZINE HYDROCHLORIDE 25 MG/ML
12.5 INJECTION, SOLUTION INTRAMUSCULAR; INTRAVENOUS ONCE
Status: COMPLETED | OUTPATIENT
Start: 2022-07-11 | End: 2022-07-11

## 2022-07-11 RX ORDER — METOPROLOL TARTRATE 50 MG/1
50 TABLET, FILM COATED ORAL 2 TIMES DAILY
Status: DISCONTINUED | OUTPATIENT
Start: 2022-07-11 | End: 2022-07-12 | Stop reason: HOSPADM

## 2022-07-11 RX ORDER — LISINOPRIL 20 MG/1
20 TABLET ORAL 2 TIMES DAILY
Status: DISCONTINUED | OUTPATIENT
Start: 2022-07-11 | End: 2022-07-12 | Stop reason: HOSPADM

## 2022-07-11 RX ORDER — LISINOPRIL 10 MG/1
10 TABLET ORAL ONCE
Status: COMPLETED | OUTPATIENT
Start: 2022-07-11 | End: 2022-07-11

## 2022-07-11 RX ORDER — DOXAZOSIN MESYLATE 4 MG/1
4 TABLET ORAL DAILY
Status: DISCONTINUED | OUTPATIENT
Start: 2022-07-11 | End: 2022-07-12 | Stop reason: HOSPADM

## 2022-07-11 RX ORDER — HYDRALAZINE HYDROCHLORIDE 20 MG/ML
10 INJECTION INTRAMUSCULAR; INTRAVENOUS EVERY 6 HOURS PRN
Status: DISCONTINUED | OUTPATIENT
Start: 2022-07-11 | End: 2022-07-12 | Stop reason: HOSPADM

## 2022-07-11 RX ORDER — CLONIDINE HYDROCHLORIDE 0.2 MG/1
0.2 TABLET ORAL ONCE
Status: COMPLETED | OUTPATIENT
Start: 2022-07-11 | End: 2022-07-11

## 2022-07-11 RX ADMIN — METOPROLOL TARTRATE 25 MG: 25 TABLET, FILM COATED ORAL at 15:56

## 2022-07-11 RX ADMIN — LISINOPRIL 10 MG: 10 TABLET ORAL at 14:08

## 2022-07-11 RX ADMIN — ISODIUM CHLORIDE 3 ML: 0.03 SOLUTION RESPIRATORY (INHALATION) at 10:24

## 2022-07-11 RX ADMIN — PIPERACILLIN AND TAZOBACTAM 3375 MG: 3; .375 INJECTION, POWDER, LYOPHILIZED, FOR SOLUTION INTRAVENOUS at 15:44

## 2022-07-11 RX ADMIN — LISINOPRIL 10 MG: 10 TABLET ORAL at 08:42

## 2022-07-11 RX ADMIN — ISODIUM CHLORIDE 3 ML: 0.03 SOLUTION RESPIRATORY (INHALATION) at 13:56

## 2022-07-11 RX ADMIN — LISINOPRIL 20 MG: 20 TABLET ORAL at 18:12

## 2022-07-11 RX ADMIN — PSYLLIUM HUSK 1 PACKET: 3.4 GRANULE ORAL at 14:08

## 2022-07-11 RX ADMIN — METOPROLOL TARTRATE 25 MG: 25 TABLET, FILM COATED ORAL at 08:42

## 2022-07-11 RX ADMIN — PROMETHAZINE HYDROCHLORIDE 12.5 MG: 25 INJECTION INTRAMUSCULAR; INTRAVENOUS at 01:49

## 2022-07-11 RX ADMIN — PIPERACILLIN AND TAZOBACTAM 3375 MG: 3; .375 INJECTION, POWDER, LYOPHILIZED, FOR SOLUTION INTRAVENOUS at 05:55

## 2022-07-11 RX ADMIN — SODIUM CHLORIDE, PRESERVATIVE FREE 10 ML: 5 INJECTION INTRAVENOUS at 08:42

## 2022-07-11 RX ADMIN — CLONIDINE HYDROCHLORIDE 0.2 MG: 0.2 TABLET ORAL at 19:29

## 2022-07-11 RX ADMIN — ISODIUM CHLORIDE 3 ML: 0.03 SOLUTION RESPIRATORY (INHALATION) at 20:47

## 2022-07-11 ASSESSMENT — PAIN SCALES - WONG BAKER
WONGBAKER_NUMERICALRESPONSE: 0

## 2022-07-11 ASSESSMENT — PAIN SCALES - GENERAL
PAINLEVEL_OUTOF10: 0

## 2022-07-11 NOTE — PROGRESS NOTES
Pt was taken off HFNC. On room air sats 90% HR 72. Pt tolerated well. Pt was given metaneb tx which she did well with.  Pt was returned to [facility] for humidification 15lpm 21%

## 2022-07-11 NOTE — FLOWSHEET NOTE
07/11/22 [facility]   Safe Environment   Safety Measures Other (comment)  (virtual safety round complete)   Patient lying in bed currently on high flow oxygen. No safety concerns or pt needs identified. Family at bedside. Call light within reach. Will continue to monitor.

## 2022-07-11 NOTE — PROGRESS NOTES
[facility] THERAPY COMMUNICATION NOTE  STRZ ICU STEPDOWN TELEMETRY 4K      Date: 2022  Patient Name: Lori Pendleton        MRN: [id number]    : 1950  (74 y.o.)    COMMUNICATION NOTE:  ST received new speech therapy evaluation order per IRINEO Reid CNP 07/10/22 at 1545. Per chart review patient currently patient of ENT; S/p transoral laryngoplasty, left partial arytenoid ectomy, left vocal cord lateralization with Dr. Syed Wagoner 2022. Per ST/ENT protocol ST with phone call to ENT prior to attempt. ST with phone conversation with LUDY Varner. Angella reporting to ST, \"Patient doing well, no need for speech therapy services at this time. \" ST to complete order as directed per ENT. Certainly, please re-consult should dysphagia symptoms occur. LUDY Cuellar verbalized agreement.    LOUISE Garg updated via phone     Martin Moritz, M.S. [address]

## 2022-07-11 NOTE — PROGRESS NOTES
Department of Otolaryngology  Progress Note    Chief Complaint:  S/p transoral laryngoplasty, left partial arytenoid ectomy, left vocal cord lateralization with Dr. Parveen Quintanilla 7/7/2022    SUBJECTIVE: Patient transferred to  last evening. She has ambulated in the hallway. She was nauseous last evening and had small emesis of clear mucous. Nursing reports that she has been receiving Lexapro, although her home medication list indicates she is no longer taking. Last /80, but was not re-checked. Continues on HFNC for heated humidification. She occasionally clears throat or coughs to produce clear mucous. She did receive phenergan and finally fell asleep a few hours ago. REVIEW OF SYSTEMS:    Negative unless stated in HPI    OBJECTIVE      Physical  VITALS:  BP (!) 190/80   Pulse 59   Temp 97.8 °F (36.6 °C) (Oral)   Resp 20   Ht 5' 1\" (1.549 m)   Wt 110 lb 3.7 oz (50 kg)   SpO2 98%   BMI 20.83 kg/m²     On my arrival, patient is resting in bed with eyes closed and breathing is unlabored with regular rate, although she does have inspiratory stridor. On HFNC. She opens eyes to her name, then readily falls back asleep. Her lung sounds are clear.       Inpatient Medications  Current Facility-Administered Medications: sodium chloride nebulizer 0.9 % solution 3 mL, 3 mL, Nebulization, TID  lidocaine (XYLOCAINE) 2 % uro-jet, , Topical, PRN  psyllium (KONSYL) 28.3 % packet 1 packet, 1 packet, Oral, Daily  escitalopram (LEXAPRO) tablet 10 mg, 10 mg, Oral, Nightly  lisinopril (PRINIVIL;ZESTRIL) tablet 10 mg, 10 mg, Oral, Daily  melatonin tablet 3 mg, 3 mg, Oral, Nightly PRN  metoprolol tartrate (LOPRESSOR) tablet 25 mg, 25 mg, Oral, BID  piperacillin-tazobactam (ZOSYN) 3,375 mg in dextrose 5 % 50 mL IVPB (mini-bag), 3,375 mg, IntraVENous, Q8H  ketorolac (TORADOL) injection 15 mg, 15 mg, IntraVENous, Q6H PRN  sodium chloride flush 0.9 % injection 5-40 mL, 5-40 mL, IntraVENous, 2 times per day  sodium chloride flush 0.9 % injection 5-40 mL, 5-40 mL, IntraVENous, PRN  0.9 % sodium chloride infusion, , IntraVENous, PRN  [Held by provider] enoxaparin Sodium (LOVENOX) injection 30 mg, 30 mg, SubCUTAneous, Q24H  ondansetron (ZOFRAN-ODT) disintegrating tablet 4 mg, 4 mg, Oral, Q8H PRN **OR** ondansetron (ZOFRAN) injection 4 mg, 4 mg, IntraVENous, Q6H PRN  polyethylene glycol (GLYCOLAX) packet 17 g, 17 g, Oral, Daily PRN  acetaminophen (TYLENOL) tablet 650 mg, 650 mg, Oral, Q6H PRN **OR** acetaminophen (TYLENOL) suppository 650 mg, 650 mg, Rectal, Q6H PRN    ASSESSMENT AND PLAN    S/p transoral laryngoplasty, left partial arytenoid ectomy, left vocal cord lateralization with Dr. Porfirio Oviedo 7/7/2022    - Patient is doing well postoperatively. Although she still has some stridor, this is significantly improved from preop; expect to improve as she heals and edema resolves. - Start meta neb TID with 0.9% nebs  - Diet minced and moist  - Ambulate and up in chair as much as possible  - HFNC while resting and/or sleeping- this is for heated humidification, not oxygenation. Will need to ensure that her oxygenation is adequate on RA while ambulating prior to discharge. - No Lovenox due to post op hematoma; SCDs for DVT prophylaxis. - Will see how patient does today. Will likely keep overnight with discharge to ENT clinic in am prior to going to local hot. Management of patient's care was collaborated with Dr Porfirio Oviedo today.     Electronically signed by IRINEO Feliz CNP on 7/11/2022 at 7:43 AM

## 2022-07-11 NOTE — PROGRESS NOTES
Attending supervising physicians attestation statement:       I Discussed the findings and plans with the resident physician  personally   and agree with the IM resident'S note as outlined . Also spoke with the staff  Regarding care plans and recommendations. BP was high today various meds   Have been added and modified       Electronically signed by Kianna Vang MD on 7/11/2022 at 7:23 PM    Hospitalist Progress Note    Patient:  Marcy Tan    Unit/Bed:-27/027-A  YOB: 1950  MRN: [id number]   Acct: [de-identified]   PCP: No primary care provider on file. Date of Admission: 7/6/2022    Assessment/Plan:  Acute Hypoxic Respiratory Failure: 2/2 post op pulm insufficiency. Currently on [facility]. Chronic Tracheal Stenosis: POD5 s/p laryngoplasty and arytenoidectomy by Dr. Elian Blank. ENT managing airway. Holding Lovenox due to post-op hematoma. Hypertension: uncontrolled ~066 mmHg systolic. Continue lisinopril and BB but will add clonidine 0.2 mg TID. Will also add Cardura 4 mg daily   Chronic diastolic heart failure: Echocardiogram 4/2022 revealed ejection fraction greater than 70% with mild left ventricular outflow tract obstruction. Minutes of decompensation. Anxiety/Depression: Lexapro     Expected discharge date:  07/12    Disposition:    [x] Home       [] TCU       [] Rehab       [] Psych       [] SNF       [] [place]       [] Other-    Chief Complaint: planned surgery     Hospital Course: Ascencion Bazzi 77-year-old white female who presented to [facility] on 7/6/2022 for planned surgery with Dr. Elian Blank for stridor and known tracheal stenosis. She has a past medical history of lifetime non-smoker, CHF, hypertension, pneumonia. 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 She had tracheostomy tube placement in 2009.   She has stridor with known tracheal stenosis. She underwent exam with Dr. Rajan Camacho which did show left vocal cord paralysis without causative lesion. On 7/6/2022 she underwent transoral laryngeal plasty left partial arytenoidectomy. Postoperatively she was transferred to the ICU. On 7/9 patient developed worsening stridor. She underwent revision surgery with Dr. Rajan Camacho. She was planned to have a second part surgery although it was not planned to be so quickly. It was noted to have a hematoma. Additional surgical intervention was completed with jet ventilation. Postoperatively patient again returned to the ICU. On 7/10 patient stable. She remains on high flow oxygen. She has no stridor at this time. She was examined by ENT who recommended transfer to stepdown which was completed on 07/11/22. Subjective (past 24 hours): Patient laying in bed comfortably with no complaints or concerns. ENT initially planned for DC but her BP running too high today ~201V mmHg systolic. ENT team also started her on metaNeb TID. Will need to ensure her oxygenation is adequate on RA with ambulation prior to DC. As of now she is requriing HHFNC.    ROS (12 point review of systems completed. Pertinent positives noted. Otherwise ROS is negative).     Medications:  Reviewed    Infusion Medications    sodium chloride Stopped (07/11/22 1714)     Scheduled Medications    lisinopril  20 mg Oral BID    metoprolol tartrate  50 mg Oral BID    cloNIDine  0.2 mg Oral Once    sodium chloride nebulizer  3 mL Nebulization TID    psyllium  1 packet Oral Daily    escitalopram  10 mg Oral Nightly    piperacillin-tazobactam  3,375 mg IntraVENous Q8H    sodium chloride flush  5-40 mL IntraVENous 2 times per day    [Held by provider] enoxaparin  30 mg SubCUTAneous Q24H     PRN Meds: hydrALAZINE, lidocaine, melatonin, ketorolac, sodium chloride flush, sodium chloride, ondansetron **OR** ondansetron, polyethylene glycol, acetaminophen **OR** acetaminophen      Intake/Output Summary (Last 24 hours) at 7/11/2022 1844  Last data filed at 7/11/2022 1729  Gross per 24 hour   Intake 570.52 ml   Output --   Net 570.52 ml     Diet:  ADULT DIET; Dysphagia - Minced and Moist    Exam:  BP (!) 186/82   Pulse 66   Temp 97 °F (36.1 °C) (Oral)   Resp 20   Ht 5' 1\" (1.549 m)   Wt 110 lb 3.7 oz (50 kg)   SpO2 94%   BMI 20.83 kg/m²     General appearance: Chronically ill appearing, No apparent distress   HEENT: Pupils equal, round, and reactive to light. Conjunctivae/corneas clear. Neck: Supple, with full range of motion. No jugular venous distention. Trachea midline. Respiratory:  Normal respiratory effort. Clear to auscultation bilaterally without Rales/Wheezes/Rhonchi, on heated high flow,  Cardiovascular: Regular rate and rhythm with normal S1/S2 without murmurs, rubs or gallops. Abdomen: Soft, non-tender, non-distended with normal bowel sounds. Musculoskeletal: passive and active ROM x 4 extremities. Skin: Skin color, texture, turgor normal.  No rashes or lesions. Neurologic:  Neurovascularly intact without any focal sensory/motor deficits. Cranial nerves: II-XII intact, grossly non-focal.  Psychiatric: Alert and oriented, thought content appropriate, normal insight  Capillary Refill: Brisk,< 3 seconds   Peripheral Pulses: +2 palpable, equal bilaterally     Labs:   Recent Labs     07/11/22  0548   WBC 6.3   HGB 11.4*   HCT 35.9*        Recent Labs     07/10/22  0655 07/11/22  0548    144   K 3.8 3.8   CL 99 102   CO2 33 36*   BUN 14 15   CREATININE 0.5 0.4   CALCIUM 8.8 9.1     No results for input(s): AST, ALT, BILIDIR, BILITOT, ALKPHOS in the last 72 hours. No results for input(s): INR in the last 72 hours. No results for input(s): Kaleen Hope in the last 72 hours.     Microbiology:      Urinalysis:      Lab Results   Component Value Date/Time    NITRU NEGATIVE 07/10/2022 09:00 AM    WBCUA 2-4 07/10/2022 09:00 AM    BACTERIA NONE SEEN 07/10/2022 09:00 AM    RBCUA 0-2 07/10/2022 09:00 AM    BLOODU NEGATIVE 07/10/2022 09:00 AM    SPECGRAV 1.022 07/10/2022 09:00 AM       Radiology:  XR CHEST PORTABLE   Final Result   1. Interval deterioration since previous study dated 8 July 2022 with increasing density at the right lung base, consistent with worsening infiltrate and/or effusion   2. No evidence of pneumothorax. 3. Cardiomegaly. 4. Prominent pulmonary arteries consistent with pulmonary hypertension. .               **This report has been created using voice recognition software. It may contain minor errors which are inherent in voice recognition technology. **      Final report electronically signed by DR Imani Vargas on 7/9/2022 1:02 PM      XR CHEST PORTABLE   Final Result   Impression:   Small bilateral pleural effusions. Mild bibasilar atelectasis. Findings    new since the prior study. This document has been electronically signed by: Donita Quiñones MD on    07/09/2022 01:27 AM      XR CHEST PORTABLE   Final Result   1. No evidence of pneumothorax. 2. Mild cardiomegaly. 3. Endotracheal tube in place. 4. Atherosclerotic calcification in the aortic arch. 5. Otherwise negative chest x-ray. .               **This report has been created using voice recognition software. It may contain minor errors which are inherent in voice recognition technology. **      Final report electronically signed by DR Imani Vargas on 7/6/2022 4:31 PM        DVT prophylaxis: [] Lovenox                                 [x] SCDs                                 [] SQ Heparin                                 [] Encourage ambulation           [] Already on Anticoagulation     Code Status: Full Code    PT/OT Eval Status: N/A    Tele:   [x] yes             [] no    Electronically signed by Jadon Vang DO on 7/11/2022 at 6:44 PM

## 2022-07-11 NOTE — PLAN OF CARE
Problem: Respiratory - Adult  Goal: Ability to maintain adequate oxygenation will improve  Description: Ability to maintain adequate oxygenation will improve  7/10/2022 2043 by Elayne Paige RCP  Outcome: Progressing   Patient on HFNC for humidification and sodium chloride treatments to improve cough effectiveness. Tolerating well at this time, will continue to monitor.

## 2022-07-11 NOTE — PROGRESS NOTES
[facility] ICU STEPDOWN TELEMETRY 4K  EVALUATION    Time:   Time In: 5983  Time Out: 1504  Timed Code Treatment Minutes: 23 Minutes  Minutes: 31          Date: 2022  Patient Name: Antonietta Barroso,   Gender: female      MRN: [id number]  : 1950  (74 y.o.)  Referring Practitioner: IRINEO Mata CNP  Diagnosis: stridor  Additional Pertinent Hx: 73 yo F with history of chronic hoarseness, bilateral vocal fold fixation s/p intubation  s/p partial cordectomy with laser, temporary tracheostomy  s/p decannulation, with stridor and known tracheal stenosis. Presented for planned firberoptic exam with Dr. Mavis Calzada. CT of Neck 2022 left vocal cord paralysis without definite causative lesion. 2022 patient did undergo transoral laryngoplasty left partial arytenoidectomy. Had prolonged recovery from anesthesia, unresponsive for ~1 hr. Tansitioned to 6L NC.   s/p  Transoral Laryngoplasty Left Partial Arytenoidectomy, Left vocal cord lateralization & s/p  SUSPENSION LARYNGOSCOPY, STAGE 2 LARYNGOPLASTY, EVACUATION HEMOTOMA, REMOVAL OBSTRUCTING FIBRINOUS DEBRIS, BRONCHEOAVELAR LAVAGE    Restrictions/Precautions:  Restrictions/Precautions: General Precautions  Position Activity Restriction  Other position/activity restrictions: neck stoma    Subjective  Chart Reviewed: Yes,Orders,Progress Notes,Operative Notes  Patient assessed for rehabilitation services?: Yes  Response to previous treatment: Patient with no complaints from previous session  Family / Caregiver Present: No    Subjective: RN approved of OT session. RN stated pt is on [facility] for humidification purposes and can be placed on 1L NC when ambulating. Pt sitting in recliner on OT arrival and agreeable to therapy. Pain: no c/o pain    Vitals: Oxygen: HFNC for humidification 15lpm 21%.  RN approved to remove HFNC (d/t only needing for humidification) and place pt on 1L NC. Pt O2 remained above 96% throughout session on 1L NC. Social/Functional History:  Lives With: Son (adult son)  Home Layout: One level   Bathroom Shower/Tub:  (spong bathes at sink)  Bathroom Toilet: Standard    Receives Help From: Family  ADL Assistance: Independent  Homemaking Assistance: Needs assistance  Ambulation Assistance: Independent  Transfer Assistance: Independent    Active : No          VISION:WFL    HEARING:  WFL    COGNITION: Decreased Insight and Decreased Problem Solving    RANGE OF MOTION:  Bilateral Upper Extremity:  WFL    STRENGTH:  Bilateral Upper Extremity:  Grossly deconditioned     SENSATION:   WFL    ADL:   Grooming: Stand By Assistance. oral care at sink   Bathing: Stand By Assistance. sponge bath while standing at sink. Upper Extremity Dressing: Minimal Assistance. gown management   Lower Extremity Dressing: Stand By Assistance. donning new sock (bilateral)   Toileting: Stand By Assistance. pericare   Toilet Transfer: Contact Guard Assistance. To/from standard toilet . BALANCE:  Standing Balance: Stand By Assistance. At sink x5 minutes     BED MOBILITY:  Not Tested    TRANSFERS:  Sit to Stand:  Contact Guard Assistance. Stand to Sit: Contact Guard Assistance. FUNCTIONAL MOBILITY:  Assistive Device: None  Assist Level:  Contact Guard Assistance. Distance: To and from bathroom x2 times   Occasionally demo'ed furniture walking      Activity Tolerance:  Patient tolerance of  treatment: good. Assessment:  Assessment: Pt admitted to the hospital d/t strider; status post surgery. Pt demo'ed performance deficits with requiring SBA for ADL tasks  and requires CGA for functional mobility and transfers which effect ability to perform ADLs and IADLs. Pt displayed  decreased endurance, balance, safety awareness  and ability to complete  ADL tasks and mobility at OF.  Pt requires further skilled OT Services to improve performance in functional tasks and educate on safety awareness for more indep with ADL tasks and mobility to return  home. Performance deficits / Impairments: Decreased functional mobility ,Decreased ADL status,Decreased strength,Decreased balance,Decreased endurance,Decreased high-level IADLs  Prognosis: Good  REQUIRES OT FOLLOW-UP: Yes  Decision Making: Medium Complexity    Treatment Initiated: Treatment and education initiated within context of evaluation. Evaluation time included review of current medical information, gathering information related to past medical, social and functional history, completion of standardized testing, formal and informal observation of tasks, assessment of data and development of plan of care and goals. Treatment time included skilled education and facilitation of tasks to increase safety and independence with ADL's for improved functional independence and quality of life. Discharge Recommendations:  Home with [address] [place] [address] with assist PRN,Continue to assess pending progress    Patient Education:     Patient Education  Education Provided: Role of [address] Strategies,Transfer Training    Equipment Recommendations:  Equipment Needed: No    Plan:  Times per Week: 3-5x  Times per Day: Daily  Current Treatment Recommendations: Strengthening,Balance training,Functional mobility training,Endurance training,Patient/Caregiver education & training,Safety education & training,Self-Care / ADL,Home management training. See long-term goal time frame for expected duration of plan of care. If no long-term goals established, a short length of stay is anticipated.     Goals:  Patient goals : return home  Short Term Goals  Time Frame for Short term goals: by discharge  Short Term Goal 1: pt will complete functional mobility within [place] distances with supervision and maintaining O2 > 90% to access ADLs  Short Term Goal 2: pt will complete BADL routine with supervsion to increase indep with self care tasks  Short Term Goal 3: pt will complete BUE exercises with min-mod resistance and no mor than min cuing to increase stregnth and endurance required for ADLs         Following session, patient left in safe position with all fall risk precautions in place.

## 2022-07-11 NOTE — PROGRESS NOTES
Patient remains nauseated after zofran, \"it didn't work, had small clear with mucus emesis. Patient refusing phenergan IM at this time. \"I don't want to take more medicine\". Will continue to follow up.

## 2022-07-12 VITALS
HEART RATE: 65 BPM | BODY MASS INDEX: 20.99 KG/M2 | HEIGHT: 61 IN | RESPIRATION RATE: 18 BRPM | DIASTOLIC BLOOD PRESSURE: 80 MMHG | TEMPERATURE: 98.2 F | SYSTOLIC BLOOD PRESSURE: 126 MMHG | WEIGHT: 111.2 LBS | OXYGEN SATURATION: 95 %

## 2022-07-12 LAB
AEROBIC CULTURE: ABNORMAL
ANAEROBIC CULTURE: ABNORMAL
GRAM STAIN RESULT: ABNORMAL
ORGANISM: ABNORMAL

## 2022-07-12 PROCEDURE — 94640 AIRWAY INHALATION TREATMENT: CPT

## 2022-07-12 PROCEDURE — 94761 N-INVAS EAR/PLS OXIMETRY MLT: CPT

## 2022-07-12 PROCEDURE — 94669 MECHANICAL CHEST WALL OSCILL: CPT

## 2022-07-12 PROCEDURE — 6370000000 HC RX 637 (ALT 250 FOR IP): Performed by: INTERNAL MEDICINE

## 2022-07-12 PROCEDURE — 6370000000 HC RX 637 (ALT 250 FOR IP): Performed by: NURSE PRACTITIONER

## 2022-07-12 PROCEDURE — 99239 HOSP IP/OBS DSCHRG MGMT >30: CPT | Performed by: INTERNAL MEDICINE

## 2022-07-12 RX ORDER — LISINOPRIL 20 MG/1
20 TABLET ORAL 2 TIMES DAILY
Qty: 30 TABLET | Refills: 0 | Status: SHIPPED | OUTPATIENT
Start: 2022-07-12

## 2022-07-12 RX ADMIN — PSYLLIUM HUSK 1 PACKET: 3.4 GRANULE ORAL at 08:26

## 2022-07-12 RX ADMIN — ISODIUM CHLORIDE 3 ML: 0.03 SOLUTION RESPIRATORY (INHALATION) at 12:33

## 2022-07-12 RX ADMIN — METOPROLOL TARTRATE 50 MG: 50 TABLET, FILM COATED ORAL at 08:25

## 2022-07-12 RX ADMIN — ISODIUM CHLORIDE 3 ML: 0.03 SOLUTION RESPIRATORY (INHALATION) at 08:00

## 2022-07-12 RX ADMIN — LISINOPRIL 20 MG: 20 TABLET ORAL at 11:27

## 2022-07-12 ASSESSMENT — PAIN SCALES - WONG BAKER
WONGBAKER_NUMERICALRESPONSE: 0

## 2022-07-12 ASSESSMENT — PAIN SCALES - GENERAL
PAINLEVEL_OUTOF10: 0

## 2022-07-12 NOTE — PROGRESS NOTES
Discharge teaching and instructions for diagnosis/procedure of transoral laryngeal plasty left partial arytenoidectomy. completed with patient using teachback method. AVS reviewed. Printed prescriptions given to patient. Patient voiced understanding regarding prescriptions, follow up appointments, and care of self at home. Discharged in a wheelchair to  home with support per family     Patient discharged with son and significant other . Follow up with dr Dustin Cyr tomorrow. Patient educated and picking up all prescriptions.

## 2022-07-12 NOTE — DISCHARGE INSTR - DIET
Good nutrition is important when healing from an illness, injury, or surgery. Follow any nutrition recommendations given to you during your hospital stay. If you were given an oral nutrition supplement while in the hospital, continue to take this supplement at home. You can take it with meals, in-between meals, and/or before bedtime. These supplements can be purchased at most local grocery stores, pharmacies, and chain [employer]-stores. If you have any questions about your diet or nutrition, call the hospital and ask for the dietitian.   Minced and moist diet

## 2022-07-12 NOTE — CARE COORDINATION
7/12/22, 10:09 AM EDT  DISCHARGE PLANNING EVALUATION    THERESA called [facility] and spoke with Cyndie. She reported that they had declined that referral for [facility]. SW asked for reason. She took name and number of this SW and reported that someone would call SW back. 7/12/22, 10:29 AM EDT  DISCHARGE PLANNING EVALUATION    SW spoke with patient about discharge planning. SW notified them that [facility] declined referral at this time but did not provide reason. THERESA explained that previous SW called PCP to get other [facility] agencies. Patient agreeable to other [facility] options that PCP suggested. THERESA called [facility] at phone number [phone] and made referral. THERESA faxed clinical information to [phone].         7/12/22, 11:57 AM EDT  DISCHARGE PLANNING EVALUATION    THERESA called [facility] and they are still reviewing information and will let THERESA know.

## 2022-07-12 NOTE — PLAN OF CARE
Problem: Discharge Planning  Goal: Discharge to home or other facility with appropriate resources  7/12/2022 1417 by Jayne Kern RN  Outcome: Completed  7/12/2022 1417 by Jayne Kern RN  Outcome: Adequate for Discharge  7/12/2022 1412 by Jayne Kern RN  Outcome: Adequate for Discharge     Problem: Safety - Adult  Goal: Free from fall injury  7/12/2022 1417 by Jayne Kern RN  Outcome: Completed  7/12/2022 1417 by Jayne Kern RN  Outcome: Adequate for Discharge  7/12/2022 1412 by Jayne Kern RN  Outcome: Adequate for Discharge     Problem: Chronic Conditions and Co-morbidities  Goal: Patient's chronic conditions and co-morbidity symptoms are monitored and maintained or improved  7/12/2022 1417 by Jayne Kern RN  Outcome: Completed  7/12/2022 1417 by Jayne Kern RN  Outcome: Adequate for Discharge  7/12/2022 1412 by Jayne Kenr RN  Outcome: Adequate for Discharge     Problem: Pain  Goal: Verbalizes/displays adequate comfort level or baseline comfort level  7/12/2022 1417 by Jayne Kern RN  Outcome: Completed  7/12/2022 1417 by Jayne Kern RN  Outcome: Adequate for Discharge  7/12/2022 1412 by Jayne Kern RN  Outcome: Adequate for Discharge     Problem: ABCDS Injury Assessment  Goal: Absence of physical injury  7/12/2022 1417 by Jayne Kern RN  Outcome: Completed  7/12/2022 1417 by Jayne Kern RN  Outcome: Adequate for Discharge  7/12/2022 1412 by Jayne Kern RN  Outcome: Adequate for Discharge     Problem: Anxiety  Goal: Will report anxiety at manageable levels  Description: INTERVENTIONS:  1. Administer medication as ordered  2. Teach and rehearse alternative coping skills  3.  Provide emotional support with 1:1 interaction with staff  7/12/2022 1417 by Jayne Kern RN  Outcome: Completed  7/12/2022 1417 by Jayne Kern RN  Outcome: Adequate for Discharge  7/12/2022 1412 by Jayne Kern RN  Outcome: Adequate for Discharge     Problem: Spiritual Care  Goal: Pt/Family able to move forward in process of forgiving self, others, and/or higher power  Description: INTERVENTIONS:  1. Assist patient/family to overcome blocks to healing by use of spiritual practices (prayer, meditation, guided imagery, reiki, breath work, etc). 2. De-myth guilt and help patient/family identify possible irrational spiritual/cultural beliefs and values. 3. Explore possibilities of making amends & reconciliation with self, others, and/or a greater power. 4. Guide patient/family in identifying painful feelings of guilt. 5. Help patient/famiy explore and identify spiritual beliefs, cultural understandings or values that may help or hinder letting go of issue. 6. Help patient/family explore feelings of anger, bitterness, resentment. 7. Help patient/family identify and examine the situation in which these feelings are experienced. 8. Help patient/family identify destructive displacement of feelings onto other individuals. 9. Invite use of sacraments/rituals/ceremonies as appropriate (e.g. - confession, anointing, smudging). 10. Refer patient/family to formal counseling and/or to [facility] community for further support work.   7/12/2022 1417 by Spencer Merchant RN  Outcome: Completed  7/12/2022 1417 by Spencer Merchant RN  Outcome: Adequate for Discharge  7/12/2022 1412 by Spencer Merchant, RN  Outcome: Adequate for Discharge

## 2022-07-12 NOTE — PLAN OF CARE
Problem: Respiratory - Adult  Goal: Ability to maintain adequate oxygenation will improve  Description: Ability to maintain adequate oxygenation will improve  7/11/2022 2055 by Poly Redding RCP  Outcome: Progressing     Problem: Respiratory - Adult  Goal: Patient's breath sounds will be clear and equal  Outcome: Progressing

## 2022-07-12 NOTE — DISCHARGE SUMMARY
DISCHARGE SUMMARY NOTE    Patient - Lucas Coreas   MRN -  [id number]   Rachana # - [de-identified]   - 1950      Date of Admission -  2022 10:36 AM  Date of Discharge-  2022  Length of Stay - 6 days  Code Status:  Full Code  Attending Physician: Jayda Doshi MD  Primary Care Physician - No primary care provider on file. Chief Complaint:     Active Hospital Problems    Diagnosis Date Noted    Status post surgery [Z98.890] 2022     Priority: Medium    Stridor [R06.1] 2022     Priority: Medium     Discharge Diagnosis:   1. Acute Hypoxic Respiratory Failure  2. Chronic Tracheal Stenosis  3. Hypertension  4. Chronic Diastolic Heart Failure  5.  Anxiety/Depression    Consultations:   IP CONSULT TO CRITICAL CARE  IP CONSULT TO SOCIAL WORK  IP CONSULT TO SPIRITUAL SERVICES  IP CONSULT TO HOME CARE NEEDS  Procedures:   Transoral laryngoplasty, Left partial arytenoid ectomy, Left vocal cord lateralization with Dr. Mary Alvarez 2022    HPI & Hospital Course:   Calvin Anaya white female who presented to [facility] on 2022 for planned surgery with Dr. Mary Alvarez for stridor and known tracheal stenosis. Jessica King has a past medical history of lifetime non-smoker, CHF, hypertension, pneumonia.  Per report patient has hoarseness and bilateral vocal cord fixation secondary to prolonged intubation in .  She had tracheostomy tube placement in .  She has stridor with known tracheal stenosis.  She underwent exam with Dr. Mary Alvarez which did show left vocal cord paralysis without causative lesion.  On 2022 she underwent transoral laryngeal plasty left partial arytenoidectomy.  Postoperatively she was transferred to the ICU.  On  patient developed worsening stridor.  She underwent revision surgery with Dr. Howard Braden was planned to have a second part surgery although it was not planned to be so quickly.  It was noted to have a hematoma.  Additional surgical intervention was completed with jet ventilation.  Postoperatively patient again returned to the ICU.  On 7/10 patient stable.  She remains on high flow oxygen.  She has no stridor at this time. Clement Terry was examined by ENT who recommended transfer to stepdown which was completed on 07/11/22. She was stable enough and able to breathe well on room air and discharged on 07/12/22. Physical Exam   General appearance: Chronically ill appearing, No apparent distress   HEENT: Pupils equal, round, and reactive to light. Conjunctivae/corneas clear. Neck: Supple, with full range of motion. No jugular venous distention. Trachea midline. Respiratory:  Normal respiratory effort. Clear to auscultation bilaterally without Rales/Wheezes/Rhonchi, on heated high flow,  Cardiovascular: Regular rate and rhythm with normal S1/S2 without murmurs, rubs or gallops. Abdomen: Soft, non-tender, non-distended with normal bowel sounds. Musculoskeletal: passive and active ROM x 4 extremities. Skin: Skin color, texture, turgor normal.  No rashes or lesions. Neurologic:  Neurovascularly intact without any focal sensory/motor deficits. Cranial nerves: II-XII intact, grossly non-focal.  Psychiatric: Alert and oriented, thought content appropriate, normal insight  Capillary Refill: Brisk,< 3 seconds   Peripheral Pulses: +2 palpable, equal bilaterally     Radiology      XR CHEST PORTABLE   Final Result   1. Interval deterioration since previous study dated 8 July 2022 with increasing density at the right lung base, consistent with worsening infiltrate and/or effusion   2. No evidence of pneumothorax. 3. Cardiomegaly. 4. Prominent pulmonary arteries consistent with pulmonary hypertension. .               **This report has been created using voice recognition software. It may contain minor errors which are inherent in voice recognition technology. **      Final report electronically signed by DR Jimmy Tran on 7/9/2022 1:02 PM      XR CHEST PORTABLE   Final Result   Impression:   Small bilateral pleural effusions. Mild bibasilar atelectasis. Findings    new since the prior study. This document has been electronically signed by: Giovana Acevedo MD on    07/09/2022 01:27 AM      XR CHEST PORTABLE   Final Result   1. No evidence of pneumothorax. 2. Mild cardiomegaly. 3. Endotracheal tube in place. 4. Atherosclerotic calcification in the aortic arch. 5. Otherwise negative chest x-ray. .               **This report has been created using voice recognition software. It may contain minor errors which are inherent in voice recognition technology. **      Final report electronically signed by DR Shellye Ahumada on 7/6/2022 4:31 PM        Labs:   CBC:    Lab Results   Component Value Date/Time    WBC 6.3 07/11/2022 05:48 AM    HGB 11.4 07/11/2022 05:48 AM    HCT 35.9 07/11/2022 05:48 AM     07/11/2022 05:48 AM     Renal:    Lab Results   Component Value Date/Time     07/11/2022 05:48 AM    K 3.8 07/11/2022 05:48 AM     07/11/2022 05:48 AM    CO2 36 07/11/2022 05:48 AM    BUN 15 07/11/2022 05:48 AM    CREATININE 0.4 07/11/2022 05:48 AM    CALCIUM 9.1 07/11/2022 05:48 AM     Disposition:   Stable to home on 7/12/2022     Discharge Medications:         Medication List      START taking these medications    Nebulizer/Tubing/Mouthpiece Kit  1 kit by Does not apply route three times daily     sodium chloride nebulizer 0.9 % solution  Take 3 mLs by nebulization in the morning, at noon, and at bedtime        CHANGE how you take these medications    lisinopril 20 MG tablet  Commonly known as: PRINIVIL;ZESTRIL  Take 1 tablet by mouth in the morning and at bedtime  What changed:   · medication strength  · See the new instructions.         CONTINUE taking these medications    amoxicillin-clavulanate 875-125 MG per tablet  Commonly known as: AUGMENTIN     budesonide 0.5 MG/2ML nebulizer suspension  Commonly known as: PULMICORT     escitalopram 10 MG tablet  Commonly known as: LEXAPRO     fluconazole 100 MG tablet  Commonly known as: DIFLUCAN     metoprolol tartrate 25 MG tablet  Commonly known as: LOPRESSOR           Where to Get Your Medications      These medications were sent to [facility]/pharmacy #[id number]- [place], [place] - [address] [phone]  [address] [place] [address] Jean Hamilton    Phone: [phone]   · Nebulizer/Tubing/Mouthpiece Kit  · sodium chloride nebulizer 0.9 % solution     These medications were sent to [facility] , [address] [place] [address]  [address] [facility], [place] [address]    Phone: [phone]   · lisinopril 20 MG tablet       Follow-up Appointments:   - Follow-up with Dr. Tamie Powell office in 3 days     Electronically signed by   Geroldine Eisenmenger, DO on 7/12/2022 at 11:45 AM

## 2022-07-12 NOTE — PLAN OF CARE
sacraments/rituals/ceremonies as appropriate (e.g. - confession, anointing, smudging). 10. Refer patient/family to formal counseling and/or to [facility] for further support work.   Outcome: Adequate for Discharge

## 2022-07-12 NOTE — PROGRESS NOTES
CLINICAL PHARMACY NOTE: MEDS TO BEDS    Total # of Prescriptions Filled: 1   The following medications were delivered to the patient:  Lisinopril 20mg    Additional Documentation:

## 2022-07-13 ENCOUNTER — APPOINTMENT (OUTPATIENT)
Dept: GENERAL RADIOLOGY | Age: 72
End: 2022-07-13
Payer: MEDICARE

## 2022-07-13 ENCOUNTER — HOSPITAL ENCOUNTER (OUTPATIENT)
Age: 72
Setting detail: OBSERVATION
Discharge: HOME | End: 2022-07-14
Attending: EMERGENCY MEDICINE | Admitting: STUDENT IN AN ORGANIZED HEALTH CARE EDUCATION/TRAINING PROGRAM
Payer: MEDICARE

## 2022-07-13 ENCOUNTER — OFFICE VISIT (OUTPATIENT)
Dept: ENT CLINIC | Age: 72
End: 2022-07-13
Payer: MEDICARE

## 2022-07-13 VITALS
DIASTOLIC BLOOD PRESSURE: 100 MMHG | TEMPERATURE: 96.9 F | WEIGHT: 111 LBS | RESPIRATION RATE: 14 BRPM | SYSTOLIC BLOOD PRESSURE: 250 MMHG | HEART RATE: 69 BPM | OXYGEN SATURATION: 96 % | BODY MASS INDEX: 20.97 KG/M2

## 2022-07-13 DIAGNOSIS — J38.7 CRICOARYTENOID JOINT FIXATION: ICD-10-CM

## 2022-07-13 DIAGNOSIS — Q31.0 LARYNGEAL WEB: ICD-10-CM

## 2022-07-13 DIAGNOSIS — I16.1 HYPERTENSIVE EMERGENCY: Primary | ICD-10-CM

## 2022-07-13 DIAGNOSIS — R06.1 CHRONIC STRIDOR: ICD-10-CM

## 2022-07-13 DIAGNOSIS — J38.6 POSTERIOR GLOTTIC STENOSIS: Primary | ICD-10-CM

## 2022-07-13 PROBLEM — I16.0 HYPERTENSIVE URGENCY: Status: ACTIVE | Noted: 2022-07-13

## 2022-07-13 LAB
ALBUMIN SERPL-MCNC: 3.8 G/DL (ref 3.5–5.1)
ALP BLD-CCNC: 64 U/L (ref 38–126)
ALT SERPL-CCNC: 26 U/L (ref 11–66)
ANION GAP SERPL CALCULATED.3IONS-SCNC: 8 MEQ/L (ref 8–16)
AST SERPL-CCNC: 31 U/L (ref 5–40)
BASOPHILS # BLD: 0.4 %
BASOPHILS ABSOLUTE: 0 THOU/MM3 (ref 0–0.1)
BILIRUB SERPL-MCNC: 0.3 MG/DL (ref 0.3–1.2)
BILIRUBIN URINE: NEGATIVE
BLOOD, URINE: NEGATIVE
BUN BLDV-MCNC: 6 MG/DL (ref 7–22)
CALCIUM SERPL-MCNC: 9.8 MG/DL (ref 8.5–10.5)
CHARACTER, URINE: CLEAR
CHLORIDE BLD-SCNC: 97 MEQ/L (ref 98–111)
CO2: 37 MEQ/L (ref 23–33)
COLOR: YELLOW
CREAT SERPL-MCNC: 0.4 MG/DL (ref 0.4–1.2)
EKG ATRIAL RATE: 63 BPM
EKG P AXIS: 44 DEGREES
EKG P-R INTERVAL: 116 MS
EKG Q-T INTERVAL: 424 MS
EKG QRS DURATION: 110 MS
EKG QTC CALCULATION (BAZETT): 433 MS
EKG R AXIS: 15 DEGREES
EKG T AXIS: 92 DEGREES
EKG VENTRICULAR RATE: 63 BPM
EOSINOPHIL # BLD: 0.4 %
EOSINOPHILS ABSOLUTE: 0 THOU/MM3 (ref 0–0.4)
ERYTHROCYTE [DISTWIDTH] IN BLOOD BY AUTOMATED COUNT: 12.5 % (ref 11.5–14.5)
ERYTHROCYTE [DISTWIDTH] IN BLOOD BY AUTOMATED COUNT: 44.6 FL (ref 35–45)
GFR SERPL CREATININE-BSD FRML MDRD: > 90 ML/MIN/1.73M2
GLUCOSE BLD-MCNC: 90 MG/DL (ref 70–108)
GLUCOSE URINE: NEGATIVE MG/DL
HCT VFR BLD CALC: 37.9 % (ref 37–47)
HEMOGLOBIN: 12.3 GM/DL (ref 12–16)
IMMATURE GRANS (ABS): 0.01 THOU/MM3 (ref 0–0.07)
IMMATURE GRANULOCYTES: 0.2 %
KETONES, URINE: 40
LEUKOCYTE ESTERASE, URINE: NEGATIVE
LYMPHOCYTES # BLD: 24.8 %
LYMPHOCYTES ABSOLUTE: 1.2 THOU/MM3 (ref 1–4.8)
MAGNESIUM: 1.8 MG/DL (ref 1.6–2.4)
MCH RBC QN AUTO: 31.3 PG (ref 26–33)
MCHC RBC AUTO-ENTMCNC: 32.5 GM/DL (ref 32.2–35.5)
MCV RBC AUTO: 96.4 FL (ref 81–99)
MONOCYTES # BLD: 8.8 %
MONOCYTES ABSOLUTE: 0.4 THOU/MM3 (ref 0.4–1.3)
NITRITE, URINE: NEGATIVE
NUCLEATED RED BLOOD CELLS: 0 /100 WBC
OSMOLALITY CALCULATION: 280.3 MOSMOL/KG (ref 275–300)
PH UA: >= 9 (ref 5–9)
PLATELET # BLD: 246 THOU/MM3 (ref 130–400)
PMV BLD AUTO: 9.5 FL (ref 9.4–12.4)
POTASSIUM SERPL-SCNC: 3.5 MEQ/L (ref 3.5–5.2)
PRO-BNP: 2386 PG/ML (ref 0–900)
PROTEIN UA: NEGATIVE
RBC # BLD: 3.93 MILL/MM3 (ref 4.2–5.4)
SEG NEUTROPHILS: 65.4 %
SEGMENTED NEUTROPHILS ABSOLUTE COUNT: 3.1 THOU/MM3 (ref 1.8–7.7)
SODIUM BLD-SCNC: 142 MEQ/L (ref 135–145)
SPECIFIC GRAVITY, URINE: 1.01 (ref 1–1.03)
TOTAL PROTEIN: 6.8 G/DL (ref 6.1–8)
TROPONIN T: < 0.01 NG/ML
TSH SERPL DL<=0.05 MIU/L-ACNC: 1.59 UIU/ML (ref 0.4–4.2)
UROBILINOGEN, URINE: 1 EU/DL (ref 0–1)
WBC # BLD: 4.8 THOU/MM3 (ref 4.8–10.8)

## 2022-07-13 PROCEDURE — 84443 ASSAY THYROID STIM HORMONE: CPT

## 2022-07-13 PROCEDURE — 84484 ASSAY OF TROPONIN QUANT: CPT

## 2022-07-13 PROCEDURE — 6370000000 HC RX 637 (ALT 250 FOR IP)

## 2022-07-13 PROCEDURE — 81003 URINALYSIS AUTO W/O SCOPE: CPT

## 2022-07-13 PROCEDURE — 96366 THER/PROPH/DIAG IV INF ADDON: CPT

## 2022-07-13 PROCEDURE — 99285 EMERGENCY DEPT VISIT HI MDM: CPT

## 2022-07-13 PROCEDURE — 80053 COMPREHEN METABOLIC PANEL: CPT

## 2022-07-13 PROCEDURE — 71045 X-RAY EXAM CHEST 1 VIEW: CPT

## 2022-07-13 PROCEDURE — 6360000002 HC RX W HCPCS: Performed by: EMERGENCY MEDICINE

## 2022-07-13 PROCEDURE — 96375 TX/PRO/DX INJ NEW DRUG ADDON: CPT

## 2022-07-13 PROCEDURE — G0378 HOSPITAL OBSERVATION PER HR: HCPCS

## 2022-07-13 PROCEDURE — 93010 ELECTROCARDIOGRAM REPORT: CPT | Performed by: INTERNAL MEDICINE

## 2022-07-13 PROCEDURE — 2500000003 HC RX 250 WO HCPCS: Performed by: EMERGENCY MEDICINE

## 2022-07-13 PROCEDURE — 93005 ELECTROCARDIOGRAM TRACING: CPT | Performed by: EMERGENCY MEDICINE

## 2022-07-13 PROCEDURE — 83735 ASSAY OF MAGNESIUM: CPT

## 2022-07-13 PROCEDURE — 85025 COMPLETE CBC W/AUTO DIFF WBC: CPT

## 2022-07-13 PROCEDURE — 96372 THER/PROPH/DIAG INJ SC/IM: CPT

## 2022-07-13 PROCEDURE — 2580000003 HC RX 258

## 2022-07-13 PROCEDURE — 31575 DIAGNOSTIC LARYNGOSCOPY: CPT | Performed by: OTOLARYNGOLOGY

## 2022-07-13 PROCEDURE — 6360000002 HC RX W HCPCS

## 2022-07-13 PROCEDURE — 83880 ASSAY OF NATRIURETIC PEPTIDE: CPT

## 2022-07-13 PROCEDURE — 96365 THER/PROPH/DIAG IV INF INIT: CPT

## 2022-07-13 PROCEDURE — 99024 POSTOP FOLLOW-UP VISIT: CPT | Performed by: OTOLARYNGOLOGY

## 2022-07-13 RX ORDER — ONDANSETRON 4 MG/1
4 TABLET, ORALLY DISINTEGRATING ORAL EVERY 8 HOURS PRN
Status: DISCONTINUED | OUTPATIENT
Start: 2022-07-13 | End: 2022-07-14 | Stop reason: HOSPADM

## 2022-07-13 RX ORDER — POLYETHYLENE GLYCOL 3350 17 G/17G
17 POWDER, FOR SOLUTION ORAL DAILY PRN
Status: DISCONTINUED | OUTPATIENT
Start: 2022-07-13 | End: 2022-07-14 | Stop reason: HOSPADM

## 2022-07-13 RX ORDER — LISINOPRIL 20 MG/1
20 TABLET ORAL 2 TIMES DAILY
Status: DISCONTINUED | OUTPATIENT
Start: 2022-07-13 | End: 2022-07-14 | Stop reason: HOSPADM

## 2022-07-13 RX ORDER — ACETAMINOPHEN 650 MG/1
650 SUPPOSITORY RECTAL EVERY 6 HOURS PRN
Status: DISCONTINUED | OUTPATIENT
Start: 2022-07-13 | End: 2022-07-14 | Stop reason: HOSPADM

## 2022-07-13 RX ORDER — ACETAMINOPHEN 325 MG/1
650 TABLET ORAL EVERY 6 HOURS PRN
Status: DISCONTINUED | OUTPATIENT
Start: 2022-07-13 | End: 2022-07-14 | Stop reason: HOSPADM

## 2022-07-13 RX ORDER — ONDANSETRON 2 MG/ML
4 INJECTION INTRAMUSCULAR; INTRAVENOUS EVERY 6 HOURS PRN
Status: DISCONTINUED | OUTPATIENT
Start: 2022-07-13 | End: 2022-07-14 | Stop reason: HOSPADM

## 2022-07-13 RX ORDER — CARVEDILOL 6.25 MG/1
6.25 TABLET ORAL 2 TIMES DAILY WITH MEALS
Status: DISCONTINUED | OUTPATIENT
Start: 2022-07-13 | End: 2022-07-14 | Stop reason: HOSPADM

## 2022-07-13 RX ORDER — SODIUM CHLORIDE 0.9 % (FLUSH) 0.9 %
5-40 SYRINGE (ML) INJECTION EVERY 12 HOURS SCHEDULED
Status: DISCONTINUED | OUTPATIENT
Start: 2022-07-13 | End: 2022-07-14 | Stop reason: HOSPADM

## 2022-07-13 RX ORDER — NITROGLYCERIN 20 MG/100ML
5-200 INJECTION INTRAVENOUS CONTINUOUS
Status: DISCONTINUED | OUTPATIENT
Start: 2022-07-13 | End: 2022-07-13

## 2022-07-13 RX ORDER — NIFEDIPINE 30 MG/1
30 TABLET, EXTENDED RELEASE ORAL DAILY
Status: DISCONTINUED | OUTPATIENT
Start: 2022-07-13 | End: 2022-07-14 | Stop reason: HOSPADM

## 2022-07-13 RX ORDER — SODIUM CHLORIDE 0.9 % (FLUSH) 0.9 %
5-40 SYRINGE (ML) INJECTION PRN
Status: DISCONTINUED | OUTPATIENT
Start: 2022-07-13 | End: 2022-07-14 | Stop reason: HOSPADM

## 2022-07-13 RX ORDER — ENOXAPARIN SODIUM 100 MG/ML
30 INJECTION SUBCUTANEOUS EVERY 24 HOURS
Status: DISCONTINUED | OUTPATIENT
Start: 2022-07-13 | End: 2022-07-14 | Stop reason: HOSPADM

## 2022-07-13 RX ORDER — HYDRALAZINE HYDROCHLORIDE 20 MG/ML
10 INJECTION INTRAMUSCULAR; INTRAVENOUS ONCE
Status: COMPLETED | OUTPATIENT
Start: 2022-07-13 | End: 2022-07-13

## 2022-07-13 RX ORDER — SODIUM CHLORIDE 9 MG/ML
INJECTION, SOLUTION INTRAVENOUS PRN
Status: DISCONTINUED | OUTPATIENT
Start: 2022-07-13 | End: 2022-07-14 | Stop reason: HOSPADM

## 2022-07-13 RX ADMIN — LISINOPRIL 20 MG: 20 TABLET ORAL at 21:04

## 2022-07-13 RX ADMIN — NITROGLYCERIN 5 MCG/MIN: 20 INJECTION INTRAVENOUS at 16:20

## 2022-07-13 RX ADMIN — HYDRALAZINE HYDROCHLORIDE 10 MG: 20 INJECTION INTRAMUSCULAR; INTRAVENOUS at 13:59

## 2022-07-13 RX ADMIN — SODIUM CHLORIDE, PRESERVATIVE FREE 10 ML: 5 INJECTION INTRAVENOUS at 21:04

## 2022-07-13 RX ADMIN — NIFEDIPINE 30 MG: 30 TABLET, FILM COATED, EXTENDED RELEASE ORAL at 21:04

## 2022-07-13 RX ADMIN — ENOXAPARIN SODIUM 30 MG: 100 INJECTION SUBCUTANEOUS at 21:03

## 2022-07-13 RX ADMIN — CARVEDILOL 6.25 MG: 6.25 TABLET, FILM COATED ORAL at 21:04

## 2022-07-13 RX ADMIN — ACETAMINOPHEN 325MG 650 MG: 325 TABLET ORAL at 21:03

## 2022-07-13 ASSESSMENT — PAIN DESCRIPTION - FREQUENCY
FREQUENCY: CONTINUOUS
FREQUENCY: INTERMITTENT
FREQUENCY: CONTINUOUS

## 2022-07-13 ASSESSMENT — PAIN DESCRIPTION - LOCATION
LOCATION: NECK
LOCATION: HEAD
LOCATION: HEAD

## 2022-07-13 ASSESSMENT — PAIN SCALES - GENERAL
PAINLEVEL_OUTOF10: 0
PAINLEVEL_OUTOF10: 3
PAINLEVEL_OUTOF10: 6
PAINLEVEL_OUTOF10: 0
PAINLEVEL_OUTOF10: 3
PAINLEVEL_OUTOF10: 3

## 2022-07-13 ASSESSMENT — PAIN DESCRIPTION - PAIN TYPE
TYPE: ACUTE PAIN

## 2022-07-13 ASSESSMENT — PAIN - FUNCTIONAL ASSESSMENT
PAIN_FUNCTIONAL_ASSESSMENT: ACTIVITIES ARE NOT PREVENTED
PAIN_FUNCTIONAL_ASSESSMENT: 0-10
PAIN_FUNCTIONAL_ASSESSMENT: PREVENTS OR INTERFERES SOME ACTIVE ACTIVITIES AND ADLS

## 2022-07-13 ASSESSMENT — PAIN DESCRIPTION - ONSET
ONSET: ON-GOING
ONSET: SUDDEN

## 2022-07-13 ASSESSMENT — PAIN DESCRIPTION - DESCRIPTORS
DESCRIPTORS: ACHING;DISCOMFORT
DESCRIPTORS: ACHING

## 2022-07-13 ASSESSMENT — PAIN DESCRIPTION - ORIENTATION
ORIENTATION: LEFT
ORIENTATION: ANTERIOR
ORIENTATION: UPPER

## 2022-07-13 ASSESSMENT — LIFESTYLE VARIABLES
HOW MANY STANDARD DRINKS CONTAINING ALCOHOL DO YOU HAVE ON A TYPICAL DAY: 1 OR 2
HOW OFTEN DO YOU HAVE A DRINK CONTAINING ALCOHOL: MONTHLY OR LESS

## 2022-07-13 NOTE — PROGRESS NOTES
[facility] [place] [facility]  [facility] EAR, NOSE AND THROAT  [facility] [address] [place] [id number]  Dept: [phone]  Dept Fax: [phone]  Loc: [phone]    Terie Landau is a 74 y.o. female who was referred by No ref. provider found for:  Chief Complaint   Patient presents with    Post-Op Check     pt is here for post op.c/o of throat feeling like she has tonsilitis, voice was stronger yesterday       HPI:     Terie Landau is a 74 y.o. female with a history of severe laryngeal stenosis when she presented to my practice last week from [place]. The patient underwent a first and the second stage laryngoplasty procedure because while the patient was in-house she developed worsening stridor after the first stage of surgery. That for stage was a partial arytenoidectomy and lateralization procedure to widen her posterior commissure and give her room to breathe. I found that she had developed a hematoma in her wound that was encroaching on her airway along with a prolapsing fibrinous clot that was further obstructing her airway. I removed the fibrinous debris and evacuated the clot and performed a supraglottoplasty as a second stage procedure to widen her airway further. She was observed for several days in-house and was discharged to her local hotel yesterday. She is here today with her son and her boyfriend reporting that she has been feeling good in terms of her breathing and voicing. Unfortunately, for reasons that are not altogether clear, she has not eaten \"anything\" of substance since her discharge yesterday. She was not restricted from eating by me. She reports being hungry. She is also complaining of some degree of chest tightness and a headache as she was being evaluated with vital signs.   Her vital signs were found to be abnormal for the presence of severe systolic and diastolic hypertension that was different between her 2 upper extremities. This was found while she was in-house and had medications given to her to control it. History: Allergies   Allergen Reactions    Ciprofloxacin      No reaction patient refuses to take it    Levaquin [Levofloxacin]      Patient refused to take mediation due to questionable side effects    Morphine Hallucinations    Statins Nausea And Vomiting     Current Outpatient Medications   Medication Sig Dispense Refill    lisinopril (PRINIVIL;ZESTRIL) 20 MG tablet Take 1 tablet by mouth in the morning and at bedtime 30 tablet 0    Respiratory Therapy Supplies (NEBULIZER/TUBING/MOUTHPIECE) KIT 1 kit by Does not apply route three times daily 1 kit 0    sodium chloride nebulizer 0.9 % solution Take 3 mLs by nebulization in the morning, at noon, and at bedtime 270 mL 2    fluconazole (DIFLUCAN) 100 MG tablet TAKE 1 TABLET (100 MG) BY MOUTH DAILY FOR 3 DAYS.  metoprolol tartrate (LOPRESSOR) 25 MG tablet Take 25 mg by mouth 2 times daily      budesonide (PULMICORT) 0.5 MG/2ML nebulizer suspension Inhale 0.5 mg into the lungs      amoxicillin-clavulanate (AUGMENTIN) 875-125 MG per tablet Take 1 tablet by mouth 2 times daily       escitalopram (LEXAPRO) 10 MG tablet Take 10 mg by mouth        No current facility-administered medications for this visit. Past Medical History:   Diagnosis Date    CHF exacerbation ([address]     due to pneumonia infection    Hypertension     Pneumonia       Past Surgical History:   Procedure Laterality Date    LARYNGOSCOPY N/A 7/6/2022    Transoral Laryngoplasty Left Partial Arytenoidectomy, Left vocal cord lateralization and post cricoid flap performed by Frida Doherty MD at [address] [place] [address] 7/9/2022    SUSPENSION LARYNGOSCOPY, STAGE 2 LARYNGOPLASTY, EVACUATION HEMOTOMA, REMOVAL OBSTRUCTING FIBRINOUS DEBRIS, BRONCHEOAVELAR LAVAGE performed by Frida Doherty MD at [facility]  2010     History reviewed.  No pertinent family history. Social History     Tobacco Use    Smoking status: Never Smoker    Smokeless tobacco: Never Used   Substance Use Topics    Alcohol use: Not Currently        Subjective:      Review of Systems  Rest of review of systems are negative, except as noted in HPI. Objective:     BP (!) 250/100 (Site: Right Upper Arm, Position: Sitting)   Pulse 69   Temp 96.9 °F (36.1 °C) (Infrared)   Resp 14   Wt 111 lb (50.3 kg)   SpO2 96%   BMI 20.97 kg/m²     Physical Exam       On general physical exam the patient is a pleasant alert and cooperative  female in no acute distress. Periodically she would have inspiratory stridor on deep inhalation when intending to produce a long phrase with a single breath. Otherwise she had mild inspiratory turbulence that was not stridor. Her voice was moderately raspy and occasionally relatively clear. Coughing was nonproductive. Procedure: Flexible laryngoscopy  Findings: The patient's larynx was abnormal for extensive postsurgical changes. She had an obvious posterior glottic aperture that was approximately 50% of normal.  She had some fibrinous debris on the left-sided supraglottic wound which was otherwise intact without evidence of recent bleeding. There was no pooling in either piriform sinus. The patient tolerated the procedure well. Vitals reviewed. CT SOFT TISSUE NECK WO CONTRAST    Result Date: 7/6/2022   Left vocal cord paresis without definite causative lesion. **This report has been created using voice recognition software. It may contain minor errors which are inherent in voice recognition technology. ** Final report electronically signed by Dr. Jerris Gottron, MD on 7/6/2022 8:44 AM    XR CHEST PORTABLE    Result Date: 7/9/2022  1. Interval deterioration since previous study dated 8 July 2022 with increasing density at the right lung base, consistent with worsening infiltrate and/or effusion 2. No evidence of pneumothorax.  3. surgical history, family history,social history, allergies and current medications were reviewed with the patient. Assessment & Plan   Diagnoses and all orders for this visit:     Diagnosis Orders   1. Posterior glottic stenosis  NM LARYNGOPLASTY LARYN WEB W/KEEL STENT INSERTION   2. Laryngeal web  NM LARYNGOPLASTY LARYN WEB W/KEEL STENT INSERTION   3. Cricoarytenoid joint fixation  NM LARYNGOPLASTY LARYN WEB W/KEEL STENT INSERTION   4. Chronic stridor  NM LARYNGOPLASTY LARYN WEB W/KEEL STENT INSERTION       Based on the patient's history and these physical findings, my biggest concern for her right now is her hypertension. Her laryngeal aperture is adequate and likely to improve as the post hematoma edema of the left hemilarynx subsides. This will take several weeks. However her hypertension could produce a cerebrovascular accident in the acute setting and is very concerning to me. As such I recommended that she allow us to transport her to the emergency department and have her evaluated there to determine what it will take to bring her blood pressure down and keep it down so that she can safely travel home this weekend. The patient understood the basis of my recommendation and says that her family and agreed to go to the ED for further evaluation. She is not on any particular dietary restrictions once she is released. Food that makes her cough or that she has trouble swallowing should be avoided. Apart from that she can eat and swallow what ever she is able to get down. I will see her back in approximately 10 weeks for an interval exam.  I will hold some OR time for her even though the next day or 2 days later so that if she needs a third stage procedure I will have the time to be able to schedule it. I explained all this in detail to the patient and her family to their satisfaction.   They reported being very pleased with the outcome of her care thus far and being willing to proceed as recommended. Return in about 10 weeks (around 9/21/2022) for Follow-up evaluation and possible stage IIIa surgery. **This report has been created using voice recognition software. It may contain minor errors which are inherent in voice recognition technology. **

## 2022-07-13 NOTE — CARE COORDINATION
7/13/22, 7:09 AM EDT    Patient goals/plan/ treatment preferences discussed by  and . Patient goals/plan/ treatment preferences reviewed with patient/ family. Patient/ family verbalize understanding of discharge plan and are in agreement with goal/plan/treatment preferences. Understanding was demonstrated using the teach back method. AVS provided by RN at time of discharge, which includes all necessary medical information pertaining to the patients current course of illness, treatment, post-discharge goals of care, and treatment preferences. Services At/After Discharge: Home Health       IMM Letter  IMM Letter given to Patient/Family/Significant other/Guardian/POA/by[de-identified] LOUISE Rivera CM  IMM Letter date given[de-identified] 07/12/22  IMM Letter time given[de-identified] 36       SW received a voicemail that [facility] is out of network with patients insurance. SW called and made referral to [facility]. SW faxed referral and clinical information. They will let SW know if they can accept. 7/13/22, 2:59 PM EDT  DISCHARGE PLANNING EVALUATION    SW received a call from [address] [place] [address] and they reported that they can accept patient as [facility] patient. SW to keep them posted on when patient flies back to [facility].

## 2022-07-13 NOTE — ED NOTES
Family is going to eat at this time- Please call if room assigned  [id number] Dukes RN  07/13/22 [id number]

## 2022-07-13 NOTE — ED PROVIDER NOTES
[address] ENCOUNTER      PATIENT NAME: Mick Hylton  MRN: [id number]  : 1950  SOUTH: 2022  PROVIDER: Swetha Guerra MD      CHIEF COMPLAINT       Chief Complaint   Patient presents with    Hypertension       Patient is seen and evaluated in a timely fashion. Nurses Notes are reviewed and I agree except as noted in the HPI. HISTORY OF PRESENT ILLNESS    Mick Hylton is a 74 y.o. female who presents to Emergency Department with Hypertension     Patient presents ED for evaluation of hypertension. SBP was around 200 at ENT's office. 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 Patient currently is seeing Dr. Dustin Cyr. She is status post transoral laryngeal plasty left partial adenoidectomy on 2022 with Dr. Dustin Cyr at [facility]    She was discharged from [facility] hospitalist service yesterday. She has no headache. She feels frontal pressure. She has no blurred vision. No slurred speech. No chest pain. No shortness of breath. No leg swelling. This HPI was provided by patient. REVIEW OF SYSTEMS   Ten-point review of systems is negative except those documented in above HPI including constitutional, HEENT, respiratory, cardiovascular, gastrointestinal, genitourinary, musculoskeletal, skin, neurological, hematological and behavioral.      PAST MEDICAL HISTORY    has a past medical history of CHF exacerbation ([address] Hypertension, and Pneumonia. SURGICAL HISTORY      has a past surgical history that includes Throat surgery (); laryngoscopy (N/A, 2022); and laryngoscopy (N/A, 2022).     CURRENT MEDICATIONS       Previous Medications    AMOXICILLIN-CLAVULANATE (AUGMENTIN) 875-125 MG PER TABLET    Take 1 tablet by mouth 2 times daily     BUDESONIDE (PULMICORT) 0.5 MG/2ML NEBULIZER SUSPENSION    Inhale 0.5 mg into the lungs    ESCITALOPRAM (LEXAPRO) 10 MG TABLET    Take 10 mg by mouth     FLUCONAZOLE (DIFLUCAN) 100 MG TABLET    TAKE 1 TABLET (100 MG) BY MOUTH DAILY FOR 3 DAYS. LISINOPRIL (PRINIVIL;ZESTRIL) 20 MG TABLET    Take 1 tablet by mouth in the morning and at bedtime    METOPROLOL TARTRATE (LOPRESSOR) 25 MG TABLET    Take 25 mg by mouth 2 times daily    RESPIRATORY THERAPY SUPPLIES (NEBULIZER/TUBING/MOUTHPIECE) KIT    1 kit by Does not apply route three times daily    SODIUM CHLORIDE NEBULIZER 0.9 % SOLUTION    Take 3 mLs by nebulization in the morning, at noon, and at bedtime       ALLERGIES     is allergic to ciprofloxacin, levaquin [levofloxacin], morphine, and statins. FAMILY HISTORY     has no family status information on file. family history is not on file. SOCIAL HISTORY      reports that she has never smoked. She has never used smokeless tobacco. She reports previous alcohol use. She reports that she does not use drugs. PHYSICAL EXAM      height is 5' 1\" (1.549 m) and weight is 111 lb (50.3 kg). Her oral temperature is 98.2 °F (36.8 °C). Her blood pressure is 167/49 (abnormal) and her pulse is 79. Her respiration is 16 and oxygen saturation is 98%. Physical Exam  Vitals and nursing note reviewed. Constitutional:       Appearance: She is well-developed. She is not diaphoretic. HENT:      Head: Normocephalic and atraumatic. Nose: Nose normal.   Eyes:      General: No scleral icterus. Right eye: No discharge. Left eye: No discharge. Conjunctiva/sclera: Conjunctivae normal.      Pupils: Pupils are equal, round, and reactive to light. Neck:      Vascular: No JVD. Trachea: No tracheal deviation. Cardiovascular:      Rate and Rhythm: Normal rate and regular rhythm. Heart sounds: Normal heart sounds. No murmur heard. No friction rub. No gallop. Pulmonary:      Effort: Pulmonary effort is normal. No respiratory distress.       Breath sounds: Normal breath sounds. Stridor present. No wheezing or rales. Chest:      Chest wall: No tenderness. Abdominal:      General: Bowel sounds are normal. There is no distension. Palpations: Abdomen is soft. There is no mass. Tenderness: There is no abdominal tenderness. There is no guarding or rebound. Hernia: No hernia is present. Musculoskeletal:         General: No tenderness or deformity. Cervical back: Normal range of motion and neck supple. Lymphadenopathy:      Cervical: No cervical adenopathy. Skin:     General: Skin is warm and dry. Capillary Refill: Capillary refill takes less than 2 seconds. Coloration: Skin is not pale. Findings: No erythema or rash. Neurological:      Mental Status: She is alert and oriented to person, place, and time. Cranial Nerves: No cranial nerve deficit. Sensory: No sensory deficit. Motor: No abnormal muscle tone. Coordination: Coordination normal.      Deep Tendon Reflexes: Reflexes normal.   Psychiatric:         Behavior: Behavior normal.         Thought Content: Thought content normal.         Judgment: Judgment normal.       ANCILLARY TEST RESULTS   EKG:    Interpreted by me  NSR, VR 63,  ms, QRS duration 110 ms,  ms, possible left atrial lodgment, left ventricular hypertrophy with repolarization changes, no ST elevation or acute T wave    LAB RESULTS:  Results for orders placed or performed during the hospital encounter of 07/13/22   CBC with Auto Differential   Result Value Ref Range    WBC 4.8 4.8 - 10.8 thou/mm3    RBC 3.93 (L) 4.20 - 5.40 mill/mm3    Hemoglobin 12.3 12.0 - 16.0 gm/dl    Hematocrit 37.9 37.0 - 47.0 %    MCV 96.4 81.0 - 99.0 fL    MCH 31.3 26.0 - 33.0 pg    MCHC 32.5 32.2 - 35.5 gm/dl    RDW-CV 12.5 11.5 - 14.5 %    RDW-SD 44.6 35.0 - 45.0 fL    Platelets 181 455 - 627 thou/mm3    MPV 9.5 9.4 - 12.4 fL    Seg Neutrophils 65.4 %    Lymphocytes 24.8 %    Monocytes 8.8 %    Eosinophils 0.4 % Basophils 0.4 %    Immature Granulocytes 0.2 %    Segs Absolute 3.1 1.8 - 7.7 thou/mm3    Lymphocytes Absolute 1.2 1.0 - 4.8 thou/mm3    Monocytes Absolute 0.4 0.4 - 1.3 thou/mm3    Eosinophils Absolute 0.0 0.0 - 0.4 thou/mm3    Basophils Absolute 0.0 0.0 - 0.1 thou/mm3    Immature Grans (Abs) 0.01 0.00 - 0.07 thou/mm3    nRBC 0 /100 wbc   Comprehensive Metabolic Panel   Result Value Ref Range    Glucose 90 70 - 108 mg/dL    CREATININE 0.4 0.4 - 1.2 mg/dL    BUN 6 (L) 7 - 22 mg/dL    Sodium 142 135 - 145 meq/L    Potassium 3.5 3.5 - 5.2 meq/L    Chloride 97 (L) 98 - 111 meq/L    CO2 37 (H) 23 - 33 meq/L    Calcium 9.8 8.5 - 10.5 mg/dL    AST 31 5 - 40 U/L    Alkaline Phosphatase 64 38 - 126 U/L    Total Protein 6.8 6.1 - 8.0 g/dL    Albumin 3.8 3.5 - 5.1 g/dL    Total Bilirubin 0.3 0.3 - 1.2 mg/dL    ALT 26 11 - 66 U/L   Troponin   Result Value Ref Range    Troponin T < 0.010 ng/ml   Brain Natriuretic Peptide   Result Value Ref Range    Pro-BNP 2386.0 (H) 0.0 - 900.0 pg/mL   Urinalysis with Reflex to Culture    Specimen: Urine   Result Value Ref Range    Glucose, Ur NEGATIVE NEGATIVE mg/dl    Bilirubin Urine NEGATIVE NEGATIVE    Ketones, Urine 40 (A) NEGATIVE    Specific Gravity, Urine 1.009 1.002 - 1.030    Blood, Urine NEGATIVE NEGATIVE    pH, UA >=9.0 5.0 - 9.0    Protein, UA NEGATIVE NEGATIVE    Urobilinogen, Urine 1.0 0.0 - 1.0 eu/dl    Nitrite, Urine NEGATIVE NEGATIVE    Leukocyte Esterase, Urine NEGATIVE NEGATIVE    Color, UA YELLOW STRAW-YELLOW    Character, Urine CLEAR CLEAR-SL CLOUD   TSH with Reflex   Result Value Ref Range    TSH 1.590 0.400 - 4.200 uIU/mL   Magnesium   Result Value Ref Range    Magnesium 1.8 1.6 - 2.4 mg/dL   Anion Gap   Result Value Ref Range    Anion Gap 8.0 8.0 - 16.0 meq/L   Glomerular Filtration Rate, Estimated   Result Value Ref Range    Est, Glom Filt Rate >90 ml/min/1.73m2   Osmolality   Result Value Ref Range    Osmolality Calc 280.3 275.0 - 300.0 mOsmol/kg   EKG Emergency 07/13/22 1245 07/13/22 1402 07/13/22 1618 07/13/22 1734   BP: (!) 204/66 (!) 215/59 (!) 190/88 (!) 167/49   Pulse: 67 64 77 79   Resp: 16 20 22 16   Temp: 98.2 °F (36.8 °C)      TempSrc: Oral      SpO2: 96% 94% 98% 98%   Weight: 111 lb (50.3 kg)      Height: 5' 1\" (1.549 m)          Critical Care: There was a high probability of clinically significant/life threatening deterioration in this patient's condition which required my urgent intervention for HTN emergency. Total critical care time was 30 minutes. This excludes any time for separately reportable procedures. FINAL IMPRESSION AND DISPOSITION      1. Hypertensive emergency        DISPOSITION Admitted 07/13/2022 06:05:00 PM      PATIENT REFERRED TO:  No follow-up provider specified.   DISCHARGE MEDICATIONS:  New Prescriptions    No medications on file     (Please note that portions of this note were completed with a voice recognition program.  Efforts were made to edit the dictations but occasionally words aremis-transcribed.)  MD Erin Ann MD  07/13/22 0806

## 2022-07-13 NOTE — ED TRIAGE NOTES
Pt presents to the ED via lobby with c/o hypertension. Pt was sent to ED from her ENT office as her BP was over 063 systolic. Pt was DC from the hospital yesterday.

## 2022-07-13 NOTE — H&P
Internal Medicine Resident History and Physical          Patient: Julio Ochoa  : 1950  MRN: [id number]     Acct: [de-identified]    PCP: No primary care provider on file. Date of Admission: 2022  Date of Service: Pt seen/examined on 22  and Admitted to Observation with expected LOS less than two midnights due to medical therapy. Assessment and Plan:  #Hypertensive urgency  #Hypertension, poor medication compliance  - BP on admission 204/66  - Patient admitted and started on oral carvedilol, lisinopril, and nifedipine  - On continuous telemetry  - Repeat BMP in the a.m. 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  - Following with ENT in the outpatient setting, will monitor for symptoms of respiratory distress, bedside nursing swallow eval ordered  - Regular diet pending bedside swallow eval  #Chronic diastolic heart failure, not in acute exacerbation  #History of depression  - Patient not taking outpatient Lexapro regimen as directed      =======================================================================      Chief Complaint: Abnormal Lab Value    History Of Present Illness: This is a 66-year-old female with a past medical history of hypertension, tracheal stenosis, and chronic diastolic heart failure. Scented to [address] ED on  after having an elevated blood pressure in the outpatient setting and being directed to come to the ED. She denied having any changes in vision, headaches, chest pain, or any other concerns. She was discharged from [address] on  after a planned surgery with Dr. Merideth Saint on . A transoral laryngeal plasty with left partial arytenoid ectomy was performed without complication. Postoperatively she had gone to the ICU where she was stabilized after having difficulty breathing. She was discharged on  after being deemed the pupil of breathing without difficulty on room air.   At the bedside, she admitted to not taking her medications after discharge as directed including her antihypertensives. In the ED, BP was 204/66, pulse 67, 96% SPO2 on room air. Labs: Sodium 142, potassium 3.5, chloride 97, CO2 37, BUN 6, CR 0.4.  proBNP 2386, troponin less than 0.010. LFTs were unremarkable. CBC was unremarkable. CXR showed interval improvement from prior study with regards to density in the right lung base. It also showed cardiomegaly and probable pulmonary hypertension, as well as atherosclerotic calcification in the aortic arch. 07/13 EKG with no remarkable changes compared to prior study from last admission. Patient was admitted for hypertensive urgency and started on oral carvedilol, lisinopril, and nifedipine      Past Medical History:        Diagnosis Date    CHF exacerbation ([facility] [address]     due to pneumonia infection    Hypertension     Pneumonia        Past Surgical History:        Procedure Laterality Date    LARYNGOSCOPY N/A 7/6/2022    Transoral Laryngoplasty Left Partial Arytenoidectomy, Left vocal cord lateralization and post cricoid flap performed by Merle Roberts MD at [facility] 7/9/2022    SUSPENSION LARYNGOSCOPY, STAGE 2 LARYNGOPLASTY, EVACUATION HEMOTOMA, REMOVAL OBSTRUCTING FIBRINOUS DEBRIS, BRONCHEOAVELAR LAVAGE performed by Merle Roberts MD at [facility]  2010       Medications Prior to Admission:   Prior to Admission medications    Medication Sig Start Date End Date Taking?  Authorizing Provider   lisinopril (PRINIVIL;ZESTRIL) 20 MG tablet Take 1 tablet by mouth in the morning and at bedtime 7/12/22  Yes Salud Chaves DO   Respiratory Therapy Supplies (NEBULIZER/TUBING/MOUTHPIECE) KIT 1 kit by Does not apply route three times daily 7/10/22  Yes IRINEO Peralta - CNP   metoprolol tartrate (LOPRESSOR) 25 MG tablet Take 25 mg by mouth 2 times daily 3/16/22 7/25/22 Yes Historical Provider, MD   sodium chloride nebulizer 0.9 % solution Take 3 mLs by nebulization in the morning, at noon, and at bedtime 7/10/22 8/9/22  Angella Ritter, APRN - CNP   fluconazole (DIFLUCAN) 100 MG tablet TAKE 1 TABLET (100 MG) BY MOUTH DAILY FOR 3 DAYS. 6/24/22   Historical Provider, MD   budesonide (PULMICORT) 0.5 MG/2ML nebulizer suspension Inhale 0.5 mg into the lungs 3/16/22 4/29/23  Historical Provider, MD   amoxicillin-clavulanate (AUGMENTIN) 875-125 MG per tablet Take 1 tablet by mouth 2 times daily  10/25/21   Historical Provider, MD   escitalopram (LEXAPRO) 10 MG tablet Take 10 mg by mouth  10/25/21 10/26/22  Historical Provider, MD       Allergies:  Ciprofloxacin, Levaquin [levofloxacin], Morphine, and Statins    Social History:    The patient currently lives with son in [place]. Tobacco use:   reports that she has never smoked. She has never used smokeless tobacco.  Alcohol use:   reports previous alcohol use. Drug use:  reports no history of drug use. Family History:  as follows:  History reviewed. No pertinent family history. Review of Systems:   Pertinent positives and negatives as noted in the HPI. Otherwise complete ROS negative. Physical Exam:    BP (!) 167/49   Pulse 79   Temp 98.2 °F (36.8 °C) (Oral)   Resp 16   Ht 5' 1\" (1.549 m)   Wt 111 lb (50.3 kg)   SpO2 98%   BMI 20.97 kg/m²       General appearance: No apparent distress, appears stated age. Eyes:  Pupils equal, round, and reactive to light. Conjunctivae/corneas clear. HENT: Head normal in appearance. External nares normal.  Oral mucosa moist without lesions. Hearing grossly intact. Neck: Supple, with full range of motion. Trachea midline. No gross JVD appreciated. Respiratory:  Normal respiratory effort. Clear to auscultation, bilaterally without rales or wheezes or rhonchi. Cardiovascular: Normal rate, regular rhythm with normal S1/S2 without murmurs. No lower extremity edema.    Abdomen: Soft, non-tender, non-distended with normal bowel sounds. Musculoskeletal: No joint swelling or tenderness. Normal tone. No abnormal movements. Skin: Warm and dry. No rashes or lesions. Neurologic:  No focal sensory/motor deficits in the upper and lower extremities. Cranial nerves:  grossly non-focal 2-12. Psychiatric: Alert and oriented, normal insight and thought content. Capillary Refill: Brisk,< 3 seconds. Peripheral Pulses: +2 palpable, equal bilaterally. Labs:     Recent Labs     07/11/22  0548 07/13/22  1353   WBC 6.3 4.8   HGB 11.4* 12.3   HCT 35.9* 37.9    246     Recent Labs     07/11/22  0548 07/13/22  1353    142   K 3.8 3.5    97*   CO2 36* 37*   BUN 15 6*   CREATININE 0.4 0.4   CALCIUM 9.1 9.8     Recent Labs     07/13/22  1353   AST 31   ALT 26   BILITOT 0.3   ALKPHOS 64     No results for input(s): INR in the last 72 hours. No results for input(s): Conrado Moralesard in the last 72 hours. Lab Results   Component Value Date/Time    NITRU NEGATIVE 07/13/2022 02:45 PM    WBCUA 2-4 07/10/2022 09:00 AM    BACTERIA NONE SEEN 07/10/2022 09:00 AM    RBCUA 0-2 07/10/2022 09:00 AM    BLOODU NEGATIVE 07/13/2022 02:45 PM    SPECGRAV 1.022 07/10/2022 09:00 AM    GLUCOSEU NEGATIVE 07/13/2022 02:45 PM         Radiology:     XR CHEST PORTABLE   Final Result   1. Interval improvement since previous study dated 9 July 2029 with clearing of the abnormal density in the right lung base. 2. Cardiomegaly and probable pulmonary hypertension. 3. Atherosclerotic calcification in the aortic arch. .               **This report has been created using voice recognition software. It may contain minor errors which are inherent in voice recognition technology. **      Final report electronically signed by DR Tommy Pineda on 7/13/2022 2:26 PM             EKG:  I have reviewed the EKG with the following interpretation: ST changes consistent with prior EKG.       Diet: No diet orders on file  DVT prophylaxis: Lovenox  Code Status: Prior  Disposition: Observation    Thank you No primary care provider on file. for the opportunity to be involved in this patient's care.     Electronically signed by Carolina Tee MD on 7/13/2022 at 5:46 PM.     Case discussed with Attending, Dr. Amanda Rodríguez

## 2022-07-13 NOTE — ED NOTES
Pt resting quietly in room no needs expressed. Nitro titrated per BP parameters. Side rails up x2 with call light in reach. Will continue to monitor.        Edith Adam RN  07/13/22 [id number]

## 2022-07-14 VITALS
TEMPERATURE: 97.9 F | SYSTOLIC BLOOD PRESSURE: 136 MMHG | HEART RATE: 82 BPM | DIASTOLIC BLOOD PRESSURE: 62 MMHG | BODY MASS INDEX: 18.16 KG/M2 | OXYGEN SATURATION: 98 % | RESPIRATION RATE: 18 BRPM | HEIGHT: 61 IN | WEIGHT: 96.2 LBS

## 2022-07-14 PROBLEM — I16.0 HYPERTENSIVE URGENCY: Status: RESOLVED | Noted: 2022-07-13 | Resolved: 2022-07-14

## 2022-07-14 PROBLEM — I50.32 CHRONIC DIASTOLIC HEART FAILURE (HCC): Status: ACTIVE | Noted: 2022-07-14

## 2022-07-14 PROBLEM — I10 PRIMARY HYPERTENSION: Status: ACTIVE | Noted: 2022-07-14

## 2022-07-14 PROBLEM — I51.7 LEFT VENTRICULAR HYPERTROPHY: Status: ACTIVE | Noted: 2022-07-14

## 2022-07-14 LAB
ANION GAP SERPL CALCULATED.3IONS-SCNC: 10 MEQ/L (ref 8–16)
BUN BLDV-MCNC: 8 MG/DL (ref 7–22)
CALCIUM SERPL-MCNC: 9.1 MG/DL (ref 8.5–10.5)
CHLORIDE BLD-SCNC: 95 MEQ/L (ref 98–111)
CO2: 32 MEQ/L (ref 23–33)
CREAT SERPL-MCNC: 0.3 MG/DL (ref 0.4–1.2)
EKG ATRIAL RATE: 80 BPM
EKG P AXIS: 59 DEGREES
EKG P-R INTERVAL: 130 MS
EKG Q-T INTERVAL: 406 MS
EKG QRS DURATION: 94 MS
EKG QTC CALCULATION (BAZETT): 468 MS
EKG R AXIS: 2 DEGREES
EKG T AXIS: 110 DEGREES
EKG VENTRICULAR RATE: 80 BPM
GFR SERPL CREATININE-BSD FRML MDRD: > 90 ML/MIN/1.73M2
GLUCOSE BLD-MCNC: 92 MG/DL (ref 70–108)
MAGNESIUM: 1.8 MG/DL (ref 1.6–2.4)
POTASSIUM REFLEX MAGNESIUM: 3.3 MEQ/L (ref 3.5–5.2)
SODIUM BLD-SCNC: 137 MEQ/L (ref 135–145)

## 2022-07-14 PROCEDURE — 93010 ELECTROCARDIOGRAM REPORT: CPT | Performed by: INTERNAL MEDICINE

## 2022-07-14 PROCEDURE — G0378 HOSPITAL OBSERVATION PER HR: HCPCS

## 2022-07-14 PROCEDURE — 36415 COLL VENOUS BLD VENIPUNCTURE: CPT

## 2022-07-14 PROCEDURE — 93005 ELECTROCARDIOGRAM TRACING: CPT

## 2022-07-14 PROCEDURE — 83735 ASSAY OF MAGNESIUM: CPT

## 2022-07-14 PROCEDURE — 99217 PR OBSERVATION CARE DISCHARGE MANAGEMENT: CPT | Performed by: STUDENT IN AN ORGANIZED HEALTH CARE EDUCATION/TRAINING PROGRAM

## 2022-07-14 PROCEDURE — 6370000000 HC RX 637 (ALT 250 FOR IP)

## 2022-07-14 PROCEDURE — 80048 BASIC METABOLIC PNL TOTAL CA: CPT

## 2022-07-14 PROCEDURE — 2580000003 HC RX 258

## 2022-07-14 RX ORDER — NIFEDIPINE 30 MG/1
30 TABLET, EXTENDED RELEASE ORAL DAILY
Qty: 30 TABLET | Refills: 3 | Status: SHIPPED | OUTPATIENT
Start: 2022-07-15

## 2022-07-14 RX ORDER — POTASSIUM CHLORIDE 20 MEQ/1
40 TABLET, EXTENDED RELEASE ORAL ONCE
Status: COMPLETED | OUTPATIENT
Start: 2022-07-14 | End: 2022-07-14

## 2022-07-14 RX ORDER — CARVEDILOL 6.25 MG/1
6.25 TABLET ORAL 2 TIMES DAILY WITH MEALS
Qty: 60 TABLET | Refills: 3 | Status: SHIPPED | OUTPATIENT
Start: 2022-07-14

## 2022-07-14 RX ADMIN — ACETAMINOPHEN 325MG 650 MG: 325 TABLET ORAL at 06:45

## 2022-07-14 RX ADMIN — LISINOPRIL 20 MG: 20 TABLET ORAL at 08:23

## 2022-07-14 RX ADMIN — CARVEDILOL 6.25 MG: 6.25 TABLET, FILM COATED ORAL at 08:23

## 2022-07-14 RX ADMIN — NIFEDIPINE 30 MG: 30 TABLET, FILM COATED, EXTENDED RELEASE ORAL at 08:23

## 2022-07-14 RX ADMIN — SODIUM CHLORIDE, PRESERVATIVE FREE 10 ML: 5 INJECTION INTRAVENOUS at 08:24

## 2022-07-14 RX ADMIN — POTASSIUM CHLORIDE 40 MEQ: 1500 TABLET, EXTENDED RELEASE ORAL at 08:23

## 2022-07-14 ASSESSMENT — PAIN - FUNCTIONAL ASSESSMENT: PAIN_FUNCTIONAL_ASSESSMENT: ACTIVITIES ARE NOT PREVENTED

## 2022-07-14 ASSESSMENT — PAIN DESCRIPTION - ORIENTATION: ORIENTATION: MID

## 2022-07-14 ASSESSMENT — PAIN DESCRIPTION - FREQUENCY: FREQUENCY: CONTINUOUS

## 2022-07-14 ASSESSMENT — PAIN SCALES - GENERAL
PAINLEVEL_OUTOF10: 0
PAINLEVEL_OUTOF10: 0
PAINLEVEL_OUTOF10: 3
PAINLEVEL_OUTOF10: 2

## 2022-07-14 ASSESSMENT — PAIN DESCRIPTION - DESCRIPTORS: DESCRIPTORS: ACHING;DISCOMFORT

## 2022-07-14 ASSESSMENT — PAIN DESCRIPTION - LOCATION: LOCATION: NECK

## 2022-07-14 ASSESSMENT — PAIN DESCRIPTION - PAIN TYPE: TYPE: ACUTE PAIN

## 2022-07-14 ASSESSMENT — PAIN DESCRIPTION - ONSET: ONSET: ON-GOING

## 2022-07-14 NOTE — PROGRESS NOTES
CLINICAL PHARMACY: DISCHARGE MED RECONCILIATION/REVIEW    [facility]) Select Patient?: No  Total # of Interventions Recommended: 0   -   Total # Interventions Accepted: 0  Intervention Severity:   - Level 1 Intervention Present?: No   - Level 2 #: 0   - Level 3 #: 0   Time Spent (min): 5    Additional Documentation:

## 2022-07-14 NOTE — DISCHARGE SUMMARY
Internal Medicine Resident Discharge Summary      Patient Identification:   Sheilah Phoenix   : 1950  MRN: [id number]   Account: [de-identified]      Patient's PCP: No primary care provider on file. Admit Date: 2022     Discharge Date: 2022    Admitting Physician: Judy Carrasco DO     Discharge Physician: Jovita Franklin MD       Hospital Course: This is a 78-year-old female with past medical history of hypertension, tracheal stenosis (s/p transoral laryngeal plasty with left partial arytenoid ectomy), and chronic diastolic heart failure. She was sent to the ED on  after having an elevated blood pressure in the outpatient setting. She denied having any changes in vision, chest pain, or other acute pains. She had 1 episode of headache the night prior which was localized, but it had no associated symptoms. [facility]  after a planned surgery with Dr. Eran Anna on . Transoral laryngeal plasty with left partial arytenoid ectomy was performed without complication. Postoperatively she had gone to the ICU where she was stabilized after having difficulty breathing. She was discharged on  after being deemed capable of breathing without difficulty on room air. At the bedside, she admitted to not taking her medications after discharge as directed including her antihypertensives. In the ED, BP was 204/66, pulse 67, 96% SPO2 on room air. Sodium 142, potassium 3.5, chloride 97, CO2 37, BUN 6, CR 0.4.  proBNP 2386, troponin less than 0.010. FTs were unremarkable. CBC was unremarkable. CXR showed interval improvement from prior study with regards to the density in the right lung base. It also showed cardiomegaly and a probable pulmonary hypertension, as well as atherosclerotic calcification of the aortic arch.  EKG with no remarkable changes compared to prior study from last admission.   Patient was admitted for hypertensive urgency and started on oral carvedilol, lisinopril, and nifedipine. Overnight, her BP improved to 137/58. Endorses generalized headache on the morning of 07/14 which dissipated with Tylenol. She also endorsed a cough productive of white sputum. She denied chest pain, fever, chills, vomiting, or diarrhea. Repeat EKG on 07/14 showed improvement in T wave inversions in the anterolateral leads. She was discharged in stable condition with oral lisinopril, carvedilol, nifedipine. 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 The patient is discharged in stable condition. Exam:     Vitals:  Vitals:    07/13/22 2309 07/14/22 0307 07/14/22 0800 07/14/22 0813   BP: (!) 131/55 (!) 156/62 (!) 152/45 (!) 137/58   Pulse: 83 86 85    Resp: 18 22 20    Temp: 97.6 °F (36.4 °C) 98.4 °F (36.9 °C) 97.6 °F (36.4 °C)    TempSrc: Oral Oral Axillary    SpO2: 93% 97%     Weight:  96 lb 3.2 oz (43.6 kg)     Height:         Weight: Weight: 96 lb 3.2 oz (43.6 kg)     24 hour intake/output:    Intake/Output Summary (Last 24 hours) at 7/14/2022 0948  Last data filed at 7/14/2022 0823  Gross per 24 hour   Intake 207.93 ml   Output 0 ml   Net 207.93 ml       General appearance: This is an elderly woman lying comfortably in bed in no acute distress   Eyes:  Pupils equal, round, and reactive to light. Conjunctivae/corneas clear. HENT: Head normal in appearance. External nares normal.  Oral mucosa moist without lesions. Hearing grossly intact. Neck: Supple, with full range of motion. Trachea midline. No gross JVD appreciated. Respiratory:  Normal respiratory effort.  Clear to auscultation, bilaterally without rales or wheezes or rhonchi. Cardiovascular: Normal rate, regular rhythm with normal S1/S2 without murmurs. No lower extremity edema. Abdomen: Soft, non-tender, non-distended with normal bowel sounds. Musculoskeletal: There is no joint swelling or tenderness. Normal tone. No abnormal movements. Skin: Warm and dry. No rashes or lesions. Neurologic:  No focal sensory/motor deficits in the upper and lower extremities. Cranial nerves:  grossly non-focal 2-12. Psychiatric: Alert and oriented, normal insight and thought content. Capillary Refill: Brisk,< 3 seconds. Peripheral Pulses: +2 palpable, equal bilaterally. Labs: For convenience and continuity at follow-up the following most recent labs are provided:    CBC:    Lab Results   Component Value Date/Time    WBC 4.8 07/13/2022 01:53 PM    HGB 12.3 07/13/2022 01:53 PM    HCT 37.9 07/13/2022 01:53 PM     07/13/2022 01:53 PM       Renal:    Lab Results   Component Value Date/Time     07/14/2022 04:11 AM    K 3.3 07/14/2022 04:11 AM    CL 95 07/14/2022 04:11 AM    CO2 32 07/14/2022 04:11 AM    BUN 8 07/14/2022 04:11 AM    CREATININE 0.3 07/14/2022 04:11 AM    CALCIUM 9.1 07/14/2022 04:11 AM         Significant Diagnostic Studies    Radiology:   XR CHEST PORTABLE   Final Result   1. Interval improvement since previous study dated 9 July 2029 with clearing of the abnormal density in the right lung base. 2. Cardiomegaly and probable pulmonary hypertension. 3. Atherosclerotic calcification in the aortic arch. .               **This report has been created using voice recognition software. It may contain minor errors which are inherent in voice recognition technology. **      Final report electronically signed by DR Gama Thomas on 7/13/2022 2:26 PM             Consults:     None    Disposition: Home  Condition at Discharge: Stable    Code Status:  Full Code     Patient Instructions:    Discharge lab work: None  Activity: activity as tolerated  Diet: ADULT DIET; Regular      Follow-up visits:   No follow-up provider specified. Discharge Medications:        Medication List      CONTINUE taking these medications    Nebulizer/Tubing/Mouthpiece Kit  1 kit by Does not apply route three times daily        ASK your doctor about these medications    amoxicillin-clavulanate 875-125 MG per tablet  Commonly known as: AUGMENTIN     budesonide 0.5 MG/2ML nebulizer suspension  Commonly known as: PULMICORT     fluconazole 100 MG tablet  Commonly known as: DIFLUCAN     lisinopril 20 MG tablet  Commonly known as: PRINIVIL;ZESTRIL  Take 1 tablet by mouth in the morning and at bedtime     metoprolol tartrate 25 MG tablet  Commonly known as: LOPRESSOR     sodium chloride nebulizer 0.9 % solution  Take 3 mLs by nebulization in the morning, at noon, and at bedtime                 Time Spent on discharge is 25 minutes in the examination, evaluation, counseling and review of medications and discharge plan. Thank you No primary care provider on file. for the opportunity to be involved in this patient's care.       Signed:    Electronically signed by Mahnaz Mac MD on 7/14/22 at 9:48 AM EDT     Case was discussed with Attending, Dr. Gina Oneil

## 2022-07-14 NOTE — CARE COORDINATION
7/14/22, 1:39 PM EDT    Patient goals/plan/ treatment preferences discussed by  and . Patient goals/plan/ treatment preferences reviewed with patient/ family. Patient/ family verbalize understanding of discharge plan and are in agreement with goal/plan/treatment preferences. Understanding was demonstrated using the teach back method. AVS provided by RN at time of discharge, which includes all necessary medical information pertaining to the patients current course of illness, treatment, post-discharge goals of care, and treatment preferences. Services At/After Discharge: [place] discharged with SO to hospitals. Plan is to fly back to [facility] on Saturday where she will receive HH. New AVS faxed to [facility] in [facility] at [phone]. First visit is scheduled for 7/19/22. She denies any other needs.

## 2022-07-14 NOTE — PROGRESS NOTES
Discharge teaching and instructions for diagnosis/procedure of Hypertensive emergency completed with patient, son, and  using teachback method. AVS reviewed. Patient and family instructed to go to the pharmacy downstairs to  her medication per pharmacist. Patient, son, and  voiced understanding regarding prescriptions, follow up appointments, and care of self at home. Discharged ambulatory and in a wheelchair to  home with support per family.

## 2022-07-14 NOTE — PLAN OF CARE
Problem: Discharge Planning  Goal: Discharge to home or other facility with appropriate resources  Outcome: Progressing  Flowsheets  Taken 7/13/2022 2229 by Rozina Pozo RN  Discharge to home or other facility with appropriate resources: Arrange for needed discharge resources and transportation as appropriate  Taken 7/13/2022 2005 by Florecita Conner RN  Discharge to home or other facility with appropriate resources:   Identify barriers to discharge with patient and caregiver   Arrange for needed discharge resources and transportation as appropriate   Identify discharge learning needs (meds, wound care, etc)  Note: Patient and family actively involved in [facility]. Discharge planning in progress at this time. Will continue to monitor. Problem: Pain  Goal: Verbalizes/displays adequate comfort level or baseline comfort level  Outcome: Progressing  Flowsheets (Taken 7/13/2022 1955)  Verbalizes/displays adequate comfort level or baseline comfort level:   Encourage patient to monitor pain and request assistance   Assess pain using appropriate pain scale   Administer analgesics based on type and severity of pain and evaluate response   Implement non-pharmacological measures as appropriate and evaluate response  Note: Patient c/o HA pain during this shift. Patient medicated per MAR. Will continue to monitor. Problem: Safety - Adult  Goal: Free from fall injury  Outcome: Progressing  Flowsheets (Taken 7/13/2022 2005)  Free From Fall Injury: Instruct family/caregiver on patient safety  Note: Patient educated on and encouraged to use the call light for assistance from staff with needs. Patient verbalizes an understanding of this. Bed wheels locked and bed in lowest position; bed alarm on. Patient possessions within reach; pathways clear at this time.      Problem: ABCDS Injury Assessment  Goal: Absence of physical injury  Outcome: Progressing  Flowsheets (Taken 7/13/2022 2005)  Absence of Physical Injury: Implement safety measures based on patient assessment  Note: Patient educated on and encouraged to use the call light for assistance from staff with needs. Patient verbalizes an understanding of this. Bed wheels locked and bed in lowest position; bed alarm on. Patient possessions within reach; pathways clear at this time. Problem: Chronic Conditions and Co-morbidities  Goal: Patient's chronic conditions and co-morbidity symptoms are monitored and maintained or improved  Outcome: Progressing  Flowsheets (Taken 7/13/2022 2005)  Care Plan - Patient's Chronic Conditions and Co-Morbidity Symptoms are Monitored and Maintained or Improved:   Monitor and assess patient's chronic conditions and comorbid symptoms for stability, deterioration, or improvement   Collaborate with multidisciplinary team to address chronic and comorbid conditions and prevent exacerbation or deterioration   Update acute care plan with appropriate goals if chronic or comorbid symptoms are exacerbated and prevent overall improvement and discharge    Care plan reviewed with patient and family. Patient and family verbalize understanding of the plan of care and contribute to goal setting.

## 2022-07-14 NOTE — CARE COORDINATION
7/14/22, 8:30 AM EDT  DISCHARGE PLANNING EVALUATION:    Sky Jernigan       Admitted: 7/13/2022/ [address] day: 0   Location: -08/008-A Reason for admit: Hypertensive urgency [I16.0]  Hypertensive emergency [I16.1]   PMH:  has a past medical history of CHF exacerbation ([address] Hypertension, and Pneumonia. Procedure:   7/13 CXR 1. Interval improvement since previous study dated 9 July 2029 with clearing of the abnormal density in the right lung base. 2. Cardiomegaly and probable pulmonary hypertension. 3. Atherosclerotic calcification in the aortic arch. .   Barriers to Discharge:  From ED, K+ 3.3, telemetry, Lovenox. PCP: No primary care provider on file.   %  Patient's Healthcare Decision Maker: Legal Next of Kin    Patient Goals/Plan/Treatment Preferences: Met with Lester Castillo, [facility]. Plan is then to return to [facility] with [place]  previously arranged by [facility]. She denies any other needs or services. They are scheduled to see her Tuesday 7/19. Transportation/Food Security/Housekeeping Addressed:  No issues identified.

## 2022-07-14 NOTE — PROGRESS NOTES
Pharmacy Medication History Note      List of current medications patient is taking is complete. Source of information: Patient    Changes made to medication list:  Medications removed (include reason, ex. therapy complete or physician discontinued):  Removed budesonide 0.5mg/2mL nebulizer suspension - Patient states she is not taking    Other notes (ex. Recent course of antibiotics, Coumadin dosing):  Denies use of other OTC or herbal medications.       Electronically signed by William Campos on 7/14/2022 at 10:48 AM

## 2022-07-14 NOTE — PROGRESS NOTES
Completed admission tasks with patient and son. Patient lives with ex- in single-level house and son is very involved in care.

## 2022-07-14 NOTE — PLAN OF CARE
Problem: Discharge Planning  Goal: Discharge to home or other facility with appropriate resources  7/14/2022 1247 by Indio Eden RN  Outcome: Completed  Flowsheets (Taken 7/14/2022 0800 by Jeannette Glez)  Discharge to home or other facility with appropriate resources:   Arrange for needed discharge resources and transportation as appropriate   Identify discharge learning needs (meds, wound care, etc)  7/14/2022 0121 by Giovanna Dubois RN  Outcome: Progressing  Flowsheets  Taken 7/13/2022 2229 by Jonathan Layton RN  Discharge to home or other facility with appropriate resources: Arrange for needed discharge resources and transportation as appropriate  Taken 7/13/2022 2005 by Giovanna Dubois RN  Discharge to home or other facility with appropriate resources:   Identify barriers to discharge with patient and caregiver   Arrange for needed discharge resources and transportation as appropriate   Identify discharge learning needs (meds, wound care, etc)  Note: Patient and family actively involved in [address] [facility]. Discharge planning in progress at this time. Will continue to monitor. Problem: Pain  Goal: Verbalizes/displays adequate comfort level or baseline comfort level  7/14/2022 1247 by Indio Eden RN  Outcome: Completed  7/14/2022 0121 by Giovanna Dubois RN  Outcome: Progressing  Flowsheets (Taken 7/13/2022 1955)  Verbalizes/displays adequate comfort level or baseline comfort level:   Encourage patient to monitor pain and request assistance   Assess pain using appropriate pain scale   Administer analgesics based on type and severity of pain and evaluate response   Implement non-pharmacological measures as appropriate and evaluate response  Note: Patient c/o HA pain during this shift. Patient medicated per MAR. Will continue to monitor.      Problem: Safety - Adult  Goal: Free from fall injury  7/14/2022 1247 by Indio Eden RN  Outcome: Completed  7/14/2022 0121 by Giovanna Dubois RN  Outcome: Progressing  Flowsheets (Taken 7/13/2022 2005)  Free From Fall Injury: Instruct family/caregiver on patient safety  Note: Patient educated on and encouraged to use the call light for assistance from staff with needs. Patient verbalizes an understanding of this. Bed wheels locked and bed in lowest position; bed alarm on. Patient possessions within reach; pathways clear at this time. Problem: ABCDS Injury Assessment  Goal: Absence of physical injury  7/14/2022 1247 by Juan Espinosa RN  Outcome: Completed  7/14/2022 0121 by Grace Mcgee RN  Outcome: Progressing  Flowsheets (Taken 7/13/2022 2005)  Absence of Physical Injury: Implement safety measures based on patient assessment  Note: Patient educated on and encouraged to use the call light for assistance from staff with needs. Patient verbalizes an understanding of this. Bed wheels locked and bed in lowest position; bed alarm on. Patient possessions within reach; pathways clear at this time.      Problem: Chronic Conditions and Co-morbidities  Goal: Patient's chronic conditions and co-morbidity symptoms are monitored and maintained or improved  7/14/2022 1247 by Juan Espinosa RN  Outcome: Completed  Flowsheets (Taken 7/14/2022 0800 by Memo Bauer)  Care Plan - Patient's Chronic Conditions and Co-Morbidity Symptoms are Monitored and Maintained or Improved:   Monitor and assess patient's chronic conditions and comorbid symptoms for stability, deterioration, or improvement   Collaborate with multidisciplinary team to address chronic and comorbid conditions and prevent exacerbation or deterioration   Update acute care plan with appropriate goals if chronic or comorbid symptoms are exacerbated and prevent overall improvement and discharge  7/14/2022 0121 by Grace Mcgee RN  Outcome: Progressing  Flowsheets (Taken 7/13/2022 2005)  Care Plan - Patient's Chronic Conditions and Co-Morbidity Symptoms are Monitored and Maintained or Improved:   Monitor and assess patient's chronic conditions and comorbid symptoms for stability, deterioration, or improvement   Collaborate with multidisciplinary team to address chronic and comorbid conditions and prevent exacerbation or deterioration   Update acute care plan with appropriate goals if chronic or comorbid symptoms are exacerbated and prevent overall improvement and discharge

## 2022-07-14 NOTE — PROGRESS NOTES
Patient admitted to [facility] Room 08 from ED    Complaint upon arrival to the room: Hypertensive urgency    IV site free of s/s of infection or infiltration. Vital signs obtained. Assessment and data collection initiated. Oriented to room. Policies and procedures for 4a explained All questions answered with no further questions at this time. Fall prevention and safety brochure discussed with patient. 2 person skin check completed with Edouard Oh RN.

## 2022-07-15 NOTE — PROGRESS NOTES
CLINICAL PHARMACY NOTE: MEDS TO BEDS    Total # of Prescriptions Filled: 2   The following medications were delivered to the patient:  Carvedilol 6.25mg  Nifedipine er osmotic 30mg    Additional Documentation:

## 2022-07-20 ENCOUNTER — TELEPHONE (OUTPATIENT)
Dept: ENT CLINIC | Age: 72
End: 2022-07-20

## 2022-07-20 NOTE — PROGRESS NOTES
CLINICAL PHARMACY NOTE: MEDS TO BEDS    Total # of Prescriptions Filled: 2   The following medications were delivered to the patient:  Carvedilol 6.25mg  Nifedipine ER 30mg    Additional Documentation:

## 2022-07-20 NOTE — TELEPHONE ENCOUNTER
I spoke to Ashanti Tristan (son) regarding Betty's return visit on 9/21/22 and about holding time in the OR for 9/22/22 should Dr Forrest Gutierrez need to take her to surgery. He informed me that he spoke to Dr Forrest Gutierrez about the possibility of following in [place] with Dr Andres Galvan. He is going to make her an appointment in the beginning of September with Dr Andres Galvan, if she is doing well, they will have him follow her there and call to cancel this appointment and surgery. If they feel she will need another surgery, they will plan on 9/22/22 here with Dr Forrest Gutierrez. I did ask him to let us know if they decide to cancel as soon as they can so we can use that surgery time for another patient.

## 2022-09-14 NOTE — TELEPHONE ENCOUNTER
Cindi Barber returned my call and said she saw Dr Elizabeth Ohara saw her on Tuesday and he was very please with her surgery by Dr Porfirio Oviedo. She said she is feeling really good as well and happy with everything except her voice. She feels like she is talking through her throat and she does not like the sound of her voice. She is asking if that will change? She did not want to keep the appointment or surgery that is tentatively planned for 9/22/22 and asked me to cancel this and see what Dr Porfirio Oviedo feels she needs to do. She did not make any flight arrangments to come to [place] next week so it needed cancelled regardless. Please advise your thoughts. She would like a call from Dr Porfirio Oviedo if possible at [phone].

## 2022-09-14 NOTE — TELEPHONE ENCOUNTER
Tried to reach Mana Lam (son) to see how Bird Strong was doing and if their plan was to still come to [place] next week for surgery or if they decided to follow in [place]. I left him a message to return my call.

## 2022-10-06 ENCOUNTER — TELEPHONE (OUTPATIENT)
Dept: ENT CLINIC | Age: 72
End: 2022-10-06

## 2022-10-06 NOTE — TELEPHONE ENCOUNTER
Trent Sheldon returned the call with her son on the phone as well. She feels good, breathing better, however she mentions she coughs all the time but her biggest concern is her voice quality. Jojo Abrams (son) said she is wanting to be able to speak better so people can understand her. I was able to understand her but had to concentrate to catch everything she was trying to say. She mentions she is now on Lisinopril for her BP. They would like a call from Dr Alexy Taylor when he returns next week.     Please call Jojo Abrams at [phone]

## 2022-10-06 NOTE — TELEPHONE ENCOUNTER
Leana Becerra left a voicemail to return her call. No details other than she'd like to speak to Dr Yelitza Hernandez. I called her son, Lucia Bar, and had to leave him a voicemail. Explained that Dr Yelitza Hernandez was out of the country but can call the office if they need anything prior to his return.

## 2023-01-25 NOTE — PROGRESS NOTES
Patient notified of discharge order. Patient states will let her son know. Hydroxyzine Counseling: Patient advised that the medication is sedating and not to drive a car after taking this medication.  Patient informed of potential adverse effects including but not limited to dry mouth, urinary retention, and blurry vision.  The patient verbalized understanding of the proper use and possible adverse effects of hydroxyzine.  All of the patient's questions and concerns were addressed.

## (undated) DEVICE — PAD,NON-ADHERENT,3X8,STERILE,LF,1/PK: Brand: MEDLINE

## (undated) DEVICE — SUTURE MCRYL SZ 3-0 L36IN ABSRB UD L36MM CT-1 1/2 CIR Y944H

## (undated) DEVICE — PACK PROCEDURE SURG SET UP SRMC

## (undated) DEVICE — Device

## (undated) DEVICE — GAUZE,SPONGE,8"X4",12PLY,XRAY,STRL,LF: Brand: MEDLINE

## (undated) DEVICE — DRAPE,EENT,SPLIT,STERILE: Brand: MEDLINE

## (undated) DEVICE — BANDAGE,GAUZE,4.5"X4.1YD,STERILE,LF: Brand: MEDLINE

## (undated) DEVICE — DILATION SYRINGE: Brand: COOK

## (undated) DEVICE — KIT,ANTI FOG,W/SPONGE & FLUID,SOFT PACK: Brand: MEDLINE

## (undated) DEVICE — HYPODERMIC SAFETY NEEDLE: Brand: MAGELLAN

## (undated) DEVICE — SYRINGE IRRIG 60ML SFT PLIABLE BLB EZ TO GRP 1 HND USE W/

## (undated) DEVICE — SUTURE PROL SZ 2-0 L36IN NONABSORBABLE BLU SH L26MM 1/2 CIR 8523H

## (undated) DEVICE — JELLY,LUBE,STERILE,FLIP TOP,TUBE,2-OZ: Brand: MEDLINE

## (undated) DEVICE — SYRINGE MED 10ML LUERLOCK TIP W/O SFTY DISP

## (undated) DEVICE — GLIDESCOPE BFLEX 3.8 BRONCHOSCOPE

## (undated) DEVICE — GOWN,SIRUS,NON REINFRCD,LARGE,SET IN SL: Brand: MEDLINE

## (undated) DEVICE — TUBING, SUCTION, 1/4" X 20', STRAIGHT: Brand: MEDLINE INDUSTRIES, INC.

## (undated) DEVICE — CODMAN® SURGICAL PATTIES 1/2" X 3" (1.27CM X 7.62CM): Brand: CODMAN®

## (undated) DEVICE — PACK-MAJOR

## (undated) DEVICE — CODMAN® SURGICAL PATTIES 1/2" X 1/2" (1.27CM X 1.27CM): Brand: CODMAN®

## (undated) DEVICE — CLIP LIG M TI 6 SIL HNDL FOR OPN AND ENDOSCP SGL APPL

## (undated) DEVICE — SUTURE PROL SZ 2-0 L18IN NONABSORBABLE BLU FS L26MM 3/8 CIR 8685H

## (undated) DEVICE — NEEDLE SPNL 22GA L7IN SPINOCAN

## (undated) DEVICE — GLOVE SURG SZ 75 L12IN FNGR THK94MIL TRNSLUC YEL LTX

## (undated) DEVICE — CORD,CAUTERY,BIPOLAR,STERILE: Brand: MEDLINE

## (undated) DEVICE — TRAP,MUCUS SPECIMEN, 80CC: Brand: MEDLINE

## (undated) DEVICE — SUTURE MCRYL SZ 4-0 L18IN ABSRB VLT P-3 L13MM 3/8 CIR REV Y464G

## (undated) DEVICE — UNIVERSAL MONOPOLAR LAPAROSCOPIC CABLE 10FT, 4MM PIN CONNECTOR: Brand: CONMED